# Patient Record
Sex: FEMALE | Race: WHITE
[De-identification: names, ages, dates, MRNs, and addresses within clinical notes are randomized per-mention and may not be internally consistent; named-entity substitution may affect disease eponyms.]

---

## 2017-02-23 NOTE — CARD
--------------- APPROVED REPORT --------------





EXAM: Two-dimensional and M-mode echocardiogram with Doppler and color Doppler.



Other Information 

Quality : GoodHR: 59bpm

Rhythm : Bradycardia



INDICATION

Cardiomaegaly



RISK FACTORS

Obesity   



2D DIMENSIONS 

RVDd2.3 (2.9-3.5cm)Left Atrium(2D)3.8 (1.6-4.0cm)

IVSd0.9 (0.7-1.1cm)Aortic Root(2D)2.6 (2.0-3.7cm)

LVDd5.1 (3.9-5.9cm)LVOT Diameter2.2 (1.8-2.4cm)

PWd0.8 (0.7-1.1cm)LVDs3.4 (2.5-4.0cm)

FS (%) 33.8 %SV78.5 ml

LVEF(%)62.3 (>50%)



Aortic Valve

AoV Peak Haile.144.2cm/sAoV VTI30.7cm

AO Peak GR.8.3mmHgLVOT Peak Haile.96.2cm/s

LVOT  VTI 23.65cmAO Mean GR.4mmHg

[place] (VMAX)2.46hq4HMY   (VTI)2.97cm2



Mitral Valve

MV E Skjrxsli526.2cm/sMV DECEL [id number]

MV A Fpnmbyay79.8cm/sMV E Mean Gr.2mmHg

MV [id number]/A  Ratio1.1

MV A [id number] (PHT)3.68cm2



TDI

E/Lateral E'13.2E/Medial E'16.9



Pulmonary Valve

PV Peak Utnyafjw79.4cm/sPV Peak Grad.3mmHg



Pulmonary Vein

S1 Eafvmidv76.6cm/sD2 Pjeikfsx39.6cm/s

PVa [id number]



 LEFT VENTRICLE 

The left ventricle is normal size. There is normal left ventricular wall thickness. The left ventricu
lar systolic function is normal and the ejection fraction is within normal range. The Ejection Fracti
on is 55-60%. There is normal LV segmental wall motion. The left ventricular diastolic function and f
illing is normal for age.



 RIGHT VENTRICLE 

The right ventricle is normal size. There is normal right ventricular wall thickness. The right ventr
icular systolic function is normal.



 ATRIA 

The left atrium is mildly dilated. The right atrium size is normal. The interatrial septum is intact 
with no evidence for an atrial septal defect or patent foramen ovale as noted on 2-D or Doppler imagi
ng.



 AORTIC VALVE 

The aortic valve is mildly thickened. Doppler and Color Flow revealed no significant aortic regurgita
tion. There is no significant aortic valvular stenosis.



 MITRAL VALVE 

The mitral valve leaflets are thickened. There is no evidence of mitral valve prolapse. There is no m
itral valve stenosis. Doppler and Color Flow revealed trace mitral regurgitation.



 TRICUSPID VALVE 

Doppler and Color Flow revealed trace tricuspid regurgitation. Unable to calculate PAP at exam time. 




 PULMONIC VALVE 

Doppler and Color Flow revealed no pulmonic valvular regurgitation. There is no pulmonic valvular paz
nosis.



 GREAT VESSELS 

The aortic root is normal in size. The ascending aorta is normal in size. The IVC is normal in size a
nd collapses >50% with inspiration.



 PERICARDIAL EFFUSION 

There is no evidence of significant pericardial effusion.



Critical Notification

Critical Value: No



<Conclusion>

The left ventricular systolic function is normal and the ejection fraction is within normal range. Th
e Ejection Fraction is 55-60%.

There is normal LV segmental wall motion.

No significant valvular disease.

## 2018-09-13 ENCOUNTER — HOSPITAL ENCOUNTER (OUTPATIENT)
Dept: HOSPITAL 61 - KCIC MRI | Age: 63
Discharge: HOME | End: 2018-09-13
Attending: ORTHOPAEDIC SURGERY
Payer: COMMERCIAL

## 2018-09-13 DIAGNOSIS — M12.88: ICD-10-CM

## 2018-09-13 DIAGNOSIS — M25.78: ICD-10-CM

## 2018-09-13 DIAGNOSIS — M47.892: Primary | ICD-10-CM

## 2018-09-13 DIAGNOSIS — M48.02: ICD-10-CM

## 2018-09-13 PROCEDURE — 72141 MRI NECK SPINE W/O DYE: CPT

## 2018-09-13 NOTE — [facility]
EXAMINATION: Magnetic resonance imaging (MRI) of the cervical spine 

without contrast 9/13/2018 3:30 PM

 

HISTORY: Right shoulder pain

 

TECHNIQUE: Multiplanar multi-weighted MRI of the cervical spine was 

performed without intravenous contrast using the standard cervical spine 

protocol.

 

Contrast information:

None administered

 

COMPARISON: None available.

 

FINDINGS: Evaluation is degraded by motion artifact.

 

The alignment of the cervical spine is normal. Vertebral bodies 

demonstrate normal signal intensity on all sequences.  No acute fracture 

is identified; however, if trauma is suspected, a CT scan would be a more 

sensitive examination for fractures.  The craniocervical junction is 

normal.  The visualized portions of the skull base and the posterior fossa

are normal. Moderate mucosal thickening involving the sphenoid sinus The 

spinal cord demonstrates normal signal intensity on all sequences.  

Intervertebral disks have normal height and signal intensity. There are no

annular fissures identified.  No soft tissue abnormality is identified.  

Normal signal voids are present in the vertebral arteries. 

 

 

C2-C3: There is minimal disc bulge. There is mild facet arthropathy. There

is no uncovertebral joint disease. There is no neuroforaminal stenosis. 

There is no spinal canal stenosis.

 

C3-C4: There is minimal disc bulge. There is moderate right and mild left 

facet arthropathy. There is mild uncovertebral joint disease. There is 

mild right neuroforaminal stenosis. There is no spinal canal stenosis.

 

C4-C5: There is a posterior disc osteophyte complex asymmetric to the 

left. There is mild facet arthropathy. There is mild uncovertebral joint 

disease. There is mild left neuroforaminal stenosis. There is no spinal 

canal stenosis.

 

C5-C6: There is a posterior disc osteophyte complex asymmetric to the left

with a left central disc extrusion. There is mild facet arthropathy. There

is mild to moderate uncovertebral joint disease. There is mild to moderate

left and mild right neuroforaminal stenosis. There is no spinal canal 

stenosis.

 

C6-C7: There is a posterior discussed by complex. There is mild facet 

arthropathy. There is mild to moderate uncovertebral joint disease. There 

is mild to moderate right and mild left neuroforaminal stenosis. There is 

no spinal canal stenosis.

 

C7-T1: The disk is normal in configuration.  There is no facet 

arthropathy. There is no uncovertebral joint disease. There is no 

neuroforaminal stenosis. There is no spinal canal stenosis.

 

T1-T2: There is a mild disc bulge asymmetric to the left. Mild facet 

arthropathy. No significant neuroforaminal or spinal canal stenosis.

 

IMPRESSION:

Mild degenerative changes of the cervical spine as described in detail 

above.

 

Electronically signed by: Tiffany Carr MD (9/13/2018 4:54 PM) 

[facility]

## 2018-09-21 ENCOUNTER — HOSPITAL ENCOUNTER (OUTPATIENT)
Dept: HOSPITAL 61 - MRI | Age: 63
Discharge: HOME | End: 2018-09-21
Attending: ORTHOPAEDIC SURGERY
Payer: COMMERCIAL

## 2018-09-21 DIAGNOSIS — M75.101: Primary | ICD-10-CM

## 2018-09-21 PROCEDURE — 73221 MRI JOINT UPR EXTREM W/O DYE: CPT

## 2018-09-21 NOTE — RAD
EXAM: MRI right shoulder

 

DATE: 9/21/2018 3:30 PM

 

COMPARISON: None

 

INDICATION: RIGHT SHOULDER PAIN X 3 MONTHS, limited range of motion

 

TECHNIQUE: Multiplanar multisequence MR imaging of the right shoulder was 

performed without IV contrast.

 

FINDINGS:

No right humeral joint effusion. Subacromial subdeltoid bursal distention 

for full-thickness rotator cuff tear described below. Mild AC joint 

degenerative changes are seen.

 

There is a full-thickness, full width tear of the supraspinatus tendon and

full-thickness partial width tear of the infraspinatus tendon measuring 

approximately 3.5 cm in AP dimension. There is tendinous retraction to the

level of the glenoid. There is moderate increased signal within the distal

infraspinatus muscle belly consistent with moderate tendinosis. There is 

moderate fatty atrophy of the supraspinatus and infraspinatus muscle 

bellies. Mild fatty atrophy of the teres minor.

 

The intra-articular long head biceps tendon is not well visualized, 

possibly severe tendinosis or partial tear. The extra-articular long head 

biceps tendon is seen within the bicipital groove. 

 

Mild degeneration of the labrum is suspected although no discrete labral 

tear is identified.. 

 

No evidence of fracture or AVN. Of note the humeral head is high riding.

 

IMPRESSION: 

1.  Full-thickness, full width tear of the supraspinatus and 

full-thickness, partial width tear of the infraspinatus tendons with 

retraction to the level of the glenoid.  

2.  Severe tendinosis versus partial tear of the intra-articular long head

biceps tendon.

3.  Subacromial-subdeltoid bursal fluid from full-thickness rotator cuff 

tear.

 

Electronically signed by: Fran Dejesus MD (9/21/2018 5:10 PM) [facility]

## 2021-09-20 ENCOUNTER — HOSPITAL ENCOUNTER (OUTPATIENT)
Dept: HOSPITAL 61 - SURGPAT | Age: 66
End: 2021-09-20
Attending: ORTHOPAEDIC SURGERY
Payer: COMMERCIAL

## 2021-09-20 DIAGNOSIS — M17.11: ICD-10-CM

## 2021-09-20 DIAGNOSIS — Z01.818: Primary | ICD-10-CM

## 2021-09-20 DIAGNOSIS — R94.31: ICD-10-CM

## 2021-09-20 LAB
ALBUMIN SERPL-MCNC: 3.4 G/DL (ref 3.4–5)
ANION GAP SERPL CALC-SCNC: 10 MMOL/L (ref 6–14)
APTT BLD: 35 SEC (ref 24–38)
BASOPHILS # BLD AUTO: 0 X10^3/UL (ref 0–0.2)
BASOPHILS NFR BLD: 1 % (ref 0–3)
BUN SERPL-MCNC: 15 MG/DL (ref 7–20)
CALCIUM SERPL-MCNC: 8.5 MG/DL (ref 8.5–10.1)
CHLORIDE SERPL-SCNC: 107 MMOL/L (ref 98–107)
CO2 SERPL-SCNC: 27 MMOL/L (ref 21–32)
CREAT SERPL-MCNC: 0.8 MG/DL (ref 0.6–1)
EOSINOPHIL NFR BLD: 0.5 X10^3/UL (ref 0–0.7)
EOSINOPHIL NFR BLD: 8 % (ref 0–3)
ERYTHROCYTE [DISTWIDTH] IN BLOOD BY AUTOMATED COUNT: 13.1 % (ref 11.5–14.5)
GFR SERPLBLD BASED ON 1.73 SQ M-ARVRAT: 71.8 ML/MIN
GLUCOSE SERPL-MCNC: 111 MG/DL (ref 70–99)
HCT VFR BLD CALC: 36.4 % (ref 36–47)
HGB BLD-MCNC: 12.7 G/DL (ref 12–15.5)
LYMPHOCYTES # BLD: 2.5 X10^3/UL (ref 1–4.8)
LYMPHOCYTES NFR BLD AUTO: 38 % (ref 24–48)
MCH RBC QN AUTO: 31 PG (ref 25–35)
MCHC RBC AUTO-ENTMCNC: 35 G/DL (ref 31–37)
MCV RBC AUTO: 87 FL (ref 79–100)
MONO #: 0.6 X10^3/UL (ref 0–1.1)
MONOCYTES NFR BLD: 9 % (ref 0–9)
NEUT #: 3 X10^3/UL (ref 1.8–7.7)
NEUTROPHILS NFR BLD AUTO: 45 % (ref 31–73)
PLATELET # BLD AUTO: 236 X10^3/UL (ref 140–400)
POTASSIUM SERPL-SCNC: 3.3 MMOL/L (ref 3.5–5.1)
PROTHROMBIN TIME: 12.5 SEC (ref 11.7–14)
RBC # BLD AUTO: 4.16 X10^6/UL (ref 3.5–5.4)
SODIUM SERPL-SCNC: 144 MMOL/L (ref 136–145)
WBC # BLD AUTO: 6.6 X10^3/UL (ref 4–11)

## 2021-09-20 PROCEDURE — 87641 MR-STAPH DNA AMP PROBE: CPT

## 2021-09-20 PROCEDURE — 85025 COMPLETE CBC W/AUTO DIFF WBC: CPT

## 2021-09-20 PROCEDURE — 36415 COLL VENOUS BLD VENIPUNCTURE: CPT

## 2021-09-20 PROCEDURE — 85651 RBC SED RATE NONAUTOMATED: CPT

## 2021-09-20 PROCEDURE — 85610 PROTHROMBIN TIME: CPT

## 2021-09-20 PROCEDURE — 83036 HEMOGLOBIN GLYCOSYLATED A1C: CPT

## 2021-09-20 PROCEDURE — 80048 BASIC METABOLIC PNL TOTAL CA: CPT

## 2021-09-20 PROCEDURE — 93005 ELECTROCARDIOGRAM TRACING: CPT

## 2021-09-20 PROCEDURE — 82306 VITAMIN D 25 HYDROXY: CPT

## 2021-09-20 PROCEDURE — 85730 THROMBOPLASTIN TIME PARTIAL: CPT

## 2021-09-20 PROCEDURE — 71046 X-RAY EXAM CHEST 2 VIEWS: CPT

## 2021-09-20 PROCEDURE — 82040 ASSAY OF SERUM ALBUMIN: CPT

## 2021-09-20 NOTE — EKG
[facility]

              [address] [place] [address]

Test Date:    2021               Test Time:    11:39:44

Pat Name:     BRIDGETTE ROPER           Department:   

Patient ID:   [id number]           Room:          

Gender:       F                        Technician:   SJ

:          1955               Requested By: JOHNNY MORALES

Order Number: [id number]           Reading MD:   Talha Rothman

                                 Measurements

Intervals                              Axis          

Rate:         54                       P:            59

NE:           146                      QRS:          41

QRSD:         92                       T:            80

QT:           444                                    

QTc:          423                                    

                           Interpretive Statements

SINUS RHYTHM

MILD T WAVE CHANGES

Electronically Signed On 2021 14:22:12 CDT by Talha Rothman dentures

## 2021-09-21 LAB — HBA1C MFR BLD: 5 % (ref 4.8–5.6)

## 2021-09-21 NOTE — RAD
EXAM:  XR CHEST 2V 9/20/2021 11:56 AM



CLINICAL INDICATION:  Joint rehabilitation class, hypertension and asthma, preop evaluation.



COMPARISON:  None



TECHNIQUE:  PA and lateral views of the chest



FINDINGS:  The heart and mediastinum are normal.  Lungs are well-expanded and clear.  No consolidatio
n, pleural effusion, or pneumothorax.  Pulmonary vascularity is normal. There is mild thoracolumbar d
egenerative disc disease. A right shoulder prosthesis is partially visualized. There is some heteroto
pic ossification at the inferior right axillary recess.



IMPRESSION: No acute cardiopulmonary abnormality.



Electronically signed by: Chitra Larsen MD (9/21/2021 10:11 AM) [id number]

## 2021-09-27 VITALS — SYSTOLIC BLOOD PRESSURE: 118 MMHG | DIASTOLIC BLOOD PRESSURE: 54 MMHG

## 2021-10-11 RX ADMIN — BACITRACIN SCH MLS/HR: 5000 INJECTION, POWDER, FOR SOLUTION INTRAMUSCULAR at 20:00

## 2021-10-12 ENCOUNTER — HOSPITAL ENCOUNTER (INPATIENT)
Dept: HOSPITAL 61 - SURG | Age: 66
LOS: 4 days | Discharge: HOME | DRG: 470 | End: 2021-10-16
Attending: ORTHOPAEDIC SURGERY | Admitting: ORTHOPAEDIC SURGERY
Payer: COMMERCIAL

## 2021-10-12 VITALS — WEIGHT: 156.31 LBS | HEIGHT: 58 IN | BODY MASS INDEX: 32.81 KG/M2

## 2021-10-12 VITALS — DIASTOLIC BLOOD PRESSURE: 52 MMHG | SYSTOLIC BLOOD PRESSURE: 91 MMHG

## 2021-10-12 VITALS — DIASTOLIC BLOOD PRESSURE: 79 MMHG | SYSTOLIC BLOOD PRESSURE: 113 MMHG

## 2021-10-12 VITALS — SYSTOLIC BLOOD PRESSURE: 97 MMHG | DIASTOLIC BLOOD PRESSURE: 51 MMHG

## 2021-10-12 VITALS — DIASTOLIC BLOOD PRESSURE: 70 MMHG | SYSTOLIC BLOOD PRESSURE: 112 MMHG

## 2021-10-12 VITALS — SYSTOLIC BLOOD PRESSURE: 125 MMHG | DIASTOLIC BLOOD PRESSURE: 55 MMHG

## 2021-10-12 VITALS — DIASTOLIC BLOOD PRESSURE: 54 MMHG | SYSTOLIC BLOOD PRESSURE: 99 MMHG

## 2021-10-12 VITALS — SYSTOLIC BLOOD PRESSURE: 99 MMHG | DIASTOLIC BLOOD PRESSURE: 52 MMHG

## 2021-10-12 VITALS — DIASTOLIC BLOOD PRESSURE: 65 MMHG | SYSTOLIC BLOOD PRESSURE: 119 MMHG

## 2021-10-12 VITALS — SYSTOLIC BLOOD PRESSURE: 109 MMHG | DIASTOLIC BLOOD PRESSURE: 62 MMHG

## 2021-10-12 VITALS — DIASTOLIC BLOOD PRESSURE: 68 MMHG | SYSTOLIC BLOOD PRESSURE: 128 MMHG

## 2021-10-12 VITALS — DIASTOLIC BLOOD PRESSURE: 62 MMHG | SYSTOLIC BLOOD PRESSURE: 106 MMHG

## 2021-10-12 DIAGNOSIS — Z88.2: ICD-10-CM

## 2021-10-12 DIAGNOSIS — E78.5: ICD-10-CM

## 2021-10-12 DIAGNOSIS — M17.11: Primary | ICD-10-CM

## 2021-10-12 DIAGNOSIS — E03.9: ICD-10-CM

## 2021-10-12 DIAGNOSIS — E66.9: ICD-10-CM

## 2021-10-12 DIAGNOSIS — J45.909: ICD-10-CM

## 2021-10-12 DIAGNOSIS — Z20.822: ICD-10-CM

## 2021-10-12 PROCEDURE — 73560 X-RAY EXAM OF KNEE 1 OR 2: CPT

## 2021-10-12 PROCEDURE — 84443 ASSAY THYROID STIM HORMONE: CPT

## 2021-10-12 PROCEDURE — C1776 JOINT DEVICE (IMPLANTABLE): HCPCS

## 2021-10-12 PROCEDURE — 82962 GLUCOSE BLOOD TEST: CPT

## 2021-10-12 PROCEDURE — 86850 RBC ANTIBODY SCREEN: CPT

## 2021-10-12 PROCEDURE — 82533 TOTAL CORTISOL: CPT

## 2021-10-12 PROCEDURE — 80048 BASIC METABOLIC PNL TOTAL CA: CPT

## 2021-10-12 PROCEDURE — 84439 ASSAY OF FREE THYROXINE: CPT

## 2021-10-12 PROCEDURE — G0378 HOSPITAL OBSERVATION PER HR: HCPCS

## 2021-10-12 PROCEDURE — A6550 NEG PRES WOUND THER DRSG SET: HCPCS

## 2021-10-12 PROCEDURE — G0379 DIRECT REFER HOSPITAL OBSERV: HCPCS

## 2021-10-12 PROCEDURE — 36415 COLL VENOUS BLD VENIPUNCTURE: CPT

## 2021-10-12 PROCEDURE — 85018 HEMOGLOBIN: CPT

## 2021-10-12 PROCEDURE — C1755 CATHETER, INTRASPINAL: HCPCS

## 2021-10-12 PROCEDURE — 83735 ASSAY OF MAGNESIUM: CPT

## 2021-10-12 PROCEDURE — 85025 COMPLETE CBC W/AUTO DIFF WBC: CPT

## 2021-10-12 PROCEDURE — A4930 STERILE, GLOVES PER PAIR: HCPCS

## 2021-10-12 PROCEDURE — 90686 IIV4 VACC NO PRSV 0.5 ML IM: CPT

## 2021-10-12 PROCEDURE — A6455 SELF-ADHER BAND >=5"/YD: HCPCS

## 2021-10-12 PROCEDURE — A4213 20+ CC SYRINGE ONLY: HCPCS

## 2021-10-12 PROCEDURE — C1713 ANCHOR/SCREW BN/BN,TIS/BN: HCPCS

## 2021-10-12 PROCEDURE — 86900 BLOOD TYPING SEROLOGIC ABO: CPT

## 2021-10-12 PROCEDURE — A4209 5+ CC STERILE SYRINGE&NEEDLE: HCPCS

## 2021-10-12 PROCEDURE — A6450 LT COMPRES BAND >=5"/YD: HCPCS

## 2021-10-12 PROCEDURE — 94760 N-INVAS EAR/PLS OXIMETRY 1: CPT

## 2021-10-12 PROCEDURE — 86901 BLOOD TYPING SEROLOGIC RH(D): CPT

## 2021-10-12 PROCEDURE — A6258 TRANSPARENT FILM >16<=48 IN: HCPCS

## 2021-10-12 PROCEDURE — 71046 X-RAY EXAM CHEST 2 VIEWS: CPT

## 2021-10-12 PROCEDURE — 90471 IMMUNIZATION ADMIN: CPT

## 2021-10-12 PROCEDURE — 81001 URINALYSIS AUTO W/SCOPE: CPT

## 2021-10-12 PROCEDURE — 85014 HEMATOCRIT: CPT

## 2021-10-12 PROCEDURE — 94640 AIRWAY INHALATION TREATMENT: CPT

## 2021-10-12 PROCEDURE — A6402 STERILE GAUZE <= 16 SQ IN: HCPCS

## 2021-10-12 RX ADMIN — OXYCODONE HYDROCHLORIDE AND ACETAMINOPHEN SCH MG: 500 TABLET ORAL at 21:06

## 2021-10-12 RX ADMIN — MONTELUKAST SODIUM SCH MG: 10 TABLET, FILM COATED ORAL at 21:06

## 2021-10-12 RX ADMIN — ONDANSETRON SCH MG: 4 TABLET, ORALLY DISINTEGRATING ORAL at 18:30

## 2021-10-12 RX ADMIN — BACITRACIN SCH MLS/HR: 5000 INJECTION, POWDER, FOR SOLUTION INTRAMUSCULAR at 20:00

## 2021-10-12 RX ADMIN — BUDESONIDE SCH MG: 0.5 INHALANT RESPIRATORY (INHALATION) at 21:00

## 2021-10-12 RX ADMIN — ALBUTEROL SULFATE SCH MG: 108 AEROSOL, METERED RESPIRATORY (INHALATION) at 21:00

## 2021-10-12 RX ADMIN — ASPIRIN SCH MG: 81 TABLET, COATED ORAL at 21:06

## 2021-10-12 RX ADMIN — ONDANSETRON SCH MG: 4 TABLET, ORALLY DISINTEGRATING ORAL at 12:00

## 2021-10-12 RX ADMIN — Medication SCH MG: at 18:30

## 2021-10-12 RX ADMIN — ONDANSETRON SCH MG: 2 INJECTION INTRAMUSCULAR; INTRAVENOUS at 18:00

## 2021-10-12 RX ADMIN — ONDANSETRON SCH MG: 2 INJECTION INTRAMUSCULAR; INTRAVENOUS at 23:59

## 2021-10-12 RX ADMIN — ONDANSETRON SCH MG: 2 INJECTION INTRAMUSCULAR; INTRAVENOUS at 12:00

## 2021-10-12 RX ADMIN — ONDANSETRON SCH MG: 4 TABLET, ORALLY DISINTEGRATING ORAL at 23:59

## 2021-10-12 RX ADMIN — TRAZODONE HYDROCHLORIDE SCH MG: 100 TABLET ORAL at 21:06

## 2021-10-12 NOTE — NUR
received fro recovery. she is denying pain at this time. states that she is having numbness 
and tingling. she has decreased sensation in right calf area. up to the bathroom and is 
unsteady in gait. up in recliner. history completed  Physical therapy here

## 2021-10-12 NOTE — NUR
Guadalupe states that she is beginning to have feeling behind her right knee. she has 
ambulated to the bathroom; gait is more steady at this time. tolerating food well. she was 
medicated with oxycodone.

## 2021-10-12 NOTE — PDOC4
OPERATIVE NOTE:


DATE OF PROCEDURE: October 12, 2021


  


PROCEDURE: 


Right total knee arthroplasty 





PREOPERATIVE DIAGNOSIS: 


Right knee osteoarthritis 





POSTOPERATIVE DIAGNOSIS: 


Same 





SURGEON: Johnny Isaac DO 





FIRST ASSISTANT: None





ANESTHESIA: General with periarticular injection 





COMPLICATIONS: None 





EBL: <50cc 





SPECIMEN: None 





DISPOSITION: To PACU stable. 





GROSS FINDINGS: Degenerative arthritis tricompartmental varus pattern





IMPLANT SPECIFICATIONS: 





[employer] 


3 Oxinium CR femur 


2 tibia 


9 mm CR tibial poly 


cement with gent 





DESCRIPTION OF PROCEDURE: 


The patient was seen in the preoperative holding area. The Surgical site was 

marked. Consent was verified. Patient received preoperative antibiotics and was 

taken back to the operating room. Once in the operating room, the department of 

anesthesia administered anesthetic as noted above. Timeout was observed. Once 

adequately anesthetized, the tourniquet was placed on the operative thigh. 

Sterile prep and drape was performed of the operative extremity. An Esmarch was 

used to exsanguinate the limb, and the tourniquet was inflated. At this point, a

15 cm anterior midline incision was made. Soft tissue dissection was carried out

sharply. Medial parapatellar arthrotomy was developed. The patella was everted. 

All visual osteophytes, meniscus, ACL as well as medial and lateral periosteal 

flaps were created.  Robotic trackers were placed in the tibia and femur as well

as confirmation pins in the femur and tibia.  Hip rotation center was obtained 

through movement.  At this point the distal femur and proximal tibia were 

mapped.  The surgical plan was developed.





Using the robotic bur the distal femur resection was performed.  The rotation of

the distal femur was set.  The appropriate sized block was pinned in place and 

the anterior resection was confirmed by robotic tracking.  At this point the 

4-in-1 cuts were made while protecting the surrounding soft tissues.





At this point the proximal tibia resection guide was put in place using the 

robotic tracking and it was pinned.  With the PCL retractor and 2 bent Hohmann's

the proximal tibial resection was performed.  Flexion and extension gaps were 

assessed and symmetric.  Trial implants were placed and the knee was run through

her motion while being tracked with both varus and valgus stress to confirm 

symmetric gapping.  The tibia rotation was marked and tibial preparation was 

completed after removal of all positional guidepins.





The patella was not resected.  With trial components in place, range of motion 

was excellent without patellar liftoff or subluxation and no varus or valgus 

instability. Full extension and flexion to 140 degrees was noted. We then 

finished both the femur and the tibia. All trial components were removed. 

Copious irrigation was run through the knee, and it was prepped for cement. 

Periarticular injection was given. 





Final components were cemented into place, cementing first the tibia, followed 

by the femur and then the patella. All excess cement was removed. The cement was

allowed to harden with the knee in full extension with a trial tibial spacer in 

place. TXA placed for 5 minutes. The final tibial polyethylene was then clipped 

into place, and a final range of motion was checked and found to be excellent. 

Copious irrigation was again run through the knee. It was suctioned dry. The 

tourniquet was deflated, and hemostasis was obtained with pressure and 

electrocautery. The parapatellar arthrotomy was closed, TXA placed, and layered 

soft tissue closure. A large bulky dressing was applied. Anesthesia was 

reversed, and the patient was transferred to postop in apparent stable conditio

n. 





DISPOSITION: 


The patient will be discharged to the general medical floor once recovered in 

the PACU. Pt. will begin on physical therapy protocol, postoperative medical 

management, and DVT prophylaxis. 





PROGNOSIS: 


Good











JOHNNY ISAAC DO       Oct 12, 2021 10:05

## 2021-10-12 NOTE — RAD
XR KNEE_RT 1-2 VIEWS



History: Reason: Patient in PACU POST OP / Spl. Instructions:  / History: 



Comparison: 9/3/2021



Technique: Portable AP and lateral views of the right knee



FINDINGS/

IMPRESSION: 

Postsurgical features from right total knee arthroplasty. Expected positioning of the distal femur an
d proximal tibial components without evidence of complication. Alignment is anatomic. Expected subcut
aneous air and fluid consistent with recent instrumentation. 



Electronically signed by: Td Price MD (10/12/2021 10:37 AM) [id number]

## 2021-10-13 VITALS — DIASTOLIC BLOOD PRESSURE: 44 MMHG | SYSTOLIC BLOOD PRESSURE: 107 MMHG

## 2021-10-13 VITALS — SYSTOLIC BLOOD PRESSURE: 97 MMHG | DIASTOLIC BLOOD PRESSURE: 52 MMHG

## 2021-10-13 VITALS — DIASTOLIC BLOOD PRESSURE: 56 MMHG | SYSTOLIC BLOOD PRESSURE: 117 MMHG

## 2021-10-13 VITALS — SYSTOLIC BLOOD PRESSURE: 122 MMHG | DIASTOLIC BLOOD PRESSURE: 53 MMHG

## 2021-10-13 VITALS — DIASTOLIC BLOOD PRESSURE: 50 MMHG | SYSTOLIC BLOOD PRESSURE: 79 MMHG

## 2021-10-13 VITALS — DIASTOLIC BLOOD PRESSURE: 46 MMHG | SYSTOLIC BLOOD PRESSURE: 91 MMHG

## 2021-10-13 LAB
HCT VFR BLD CALC: 31.3 % (ref 36–47)
HGB BLD-MCNC: 10.8 G/DL (ref 12–15.5)
MCHC RBC AUTO-ENTMCNC: 35 G/DL (ref 31–37)

## 2021-10-13 RX ADMIN — ALBUTEROL SULFATE SCH MG: 108 AEROSOL, METERED RESPIRATORY (INHALATION) at 13:06

## 2021-10-13 RX ADMIN — LEVOTHYROXINE SODIUM SCH MCG: 0.11 TABLET ORAL at 07:25

## 2021-10-13 RX ADMIN — Medication SCH MG: at 15:37

## 2021-10-13 RX ADMIN — OXYCODONE HYDROCHLORIDE AND ACETAMINOPHEN SCH MG: 500 TABLET ORAL at 07:48

## 2021-10-13 RX ADMIN — Medication SCH MG: at 07:48

## 2021-10-13 RX ADMIN — SENNOSIDES AND DOCUSATE SODIUM SCH TAB: 8.6; 5 TABLET ORAL at 07:47

## 2021-10-13 RX ADMIN — ONDANSETRON SCH MG: 2 INJECTION INTRAMUSCULAR; INTRAVENOUS at 06:00

## 2021-10-13 RX ADMIN — TRAZODONE HYDROCHLORIDE SCH MG: 100 TABLET ORAL at 21:02

## 2021-10-13 RX ADMIN — BUDESONIDE SCH MG: 0.5 INHALANT RESPIRATORY (INHALATION) at 19:54

## 2021-10-13 RX ADMIN — OXYCODONE HYDROCHLORIDE AND ACETAMINOPHEN SCH MG: 500 TABLET ORAL at 21:02

## 2021-10-13 RX ADMIN — MONTELUKAST SODIUM SCH MG: 10 TABLET, FILM COATED ORAL at 21:02

## 2021-10-13 RX ADMIN — MELOXICAM SCH MG: 7.5 TABLET ORAL at 07:47

## 2021-10-13 RX ADMIN — ALBUTEROL SULFATE SCH MG: 108 AEROSOL, METERED RESPIRATORY (INHALATION) at 07:27

## 2021-10-13 RX ADMIN — MULTIPLE VITAMINS W/ MINERALS TAB SCH TAB: TAB at 07:48

## 2021-10-13 RX ADMIN — ALBUTEROL SULFATE SCH MG: 108 AEROSOL, METERED RESPIRATORY (INHALATION) at 19:54

## 2021-10-13 RX ADMIN — ONDANSETRON SCH MG: 4 TABLET, ORALLY DISINTEGRATING ORAL at 06:00

## 2021-10-13 RX ADMIN — ALBUTEROL SULFATE SCH MG: 108 AEROSOL, METERED RESPIRATORY (INHALATION) at 00:00

## 2021-10-13 RX ADMIN — ASPIRIN SCH MG: 81 TABLET, COATED ORAL at 07:46

## 2021-10-13 RX ADMIN — ASPIRIN SCH MG: 81 TABLET, COATED ORAL at 21:02

## 2021-10-13 RX ADMIN — BACITRACIN SCH MLS/HR: 5000 INJECTION, POWDER, FOR SOLUTION INTRAMUSCULAR at 19:55

## 2021-10-13 RX ADMIN — PANTOPRAZOLE SODIUM SCH MG: 40 TABLET, DELAYED RELEASE ORAL at 07:25

## 2021-10-13 RX ADMIN — BUDESONIDE SCH MG: 0.5 INHALANT RESPIRATORY (INHALATION) at 07:27

## 2021-10-13 NOTE — NUR
Patient this morning is having some orthostatic BP issues with dizziness/nausea/headache. BP 
100/41 during OT with dizziness present. BP prior to PT 79/50 with dizziness and a headache. 
Patient was also appeared pale and was starting to sweat. Went to give bolus of fluids due 
to low BP but IV was blown. New IV started in left hand and bolus of 200 ml given to help 
improve her BP. Will continue to give IV fluids during the day today to help stabilize her 
BP. Dr Singer notified who rounded for [facility] this AM. Will continue to monitor.

## 2021-10-14 VITALS — SYSTOLIC BLOOD PRESSURE: 112 MMHG | DIASTOLIC BLOOD PRESSURE: 63 MMHG

## 2021-10-14 VITALS — SYSTOLIC BLOOD PRESSURE: 104 MMHG | DIASTOLIC BLOOD PRESSURE: 59 MMHG

## 2021-10-14 VITALS — DIASTOLIC BLOOD PRESSURE: 58 MMHG | SYSTOLIC BLOOD PRESSURE: 143 MMHG

## 2021-10-14 VITALS — DIASTOLIC BLOOD PRESSURE: 55 MMHG | SYSTOLIC BLOOD PRESSURE: 104 MMHG

## 2021-10-14 VITALS — SYSTOLIC BLOOD PRESSURE: 117 MMHG | DIASTOLIC BLOOD PRESSURE: 55 MMHG

## 2021-10-14 VITALS — DIASTOLIC BLOOD PRESSURE: 46 MMHG | SYSTOLIC BLOOD PRESSURE: 116 MMHG

## 2021-10-14 LAB
HCT VFR BLD CALC: 32.3 % (ref 36–47)
HGB BLD-MCNC: 10.8 G/DL (ref 12–15.5)
MCHC RBC AUTO-ENTMCNC: 34 G/DL (ref 31–37)

## 2021-10-14 PROCEDURE — 0SRC069 REPLACEMENT OF RIGHT KNEE JOINT WITH OXIDIZED ZIRCONIUM ON POLYETHYLENE SYNTHETIC SUBSTITUTE, CEMENTED, OPEN APPROACH: ICD-10-PCS | Performed by: ORTHOPAEDIC SURGERY

## 2021-10-14 PROCEDURE — 8E0Y0CZ ROBOTIC ASSISTED PROCEDURE OF LOWER EXTREMITY, OPEN APPROACH: ICD-10-PCS | Performed by: ORTHOPAEDIC SURGERY

## 2021-10-14 RX ADMIN — ASPIRIN SCH MG: 81 TABLET, COATED ORAL at 09:19

## 2021-10-14 RX ADMIN — BACITRACIN SCH MLS/HR: 5000 INJECTION, POWDER, FOR SOLUTION INTRAMUSCULAR at 17:08

## 2021-10-14 RX ADMIN — MULTIPLE VITAMINS W/ MINERALS TAB SCH TAB: TAB at 09:19

## 2021-10-14 RX ADMIN — Medication SCH MG: at 09:19

## 2021-10-14 RX ADMIN — MONTELUKAST SODIUM SCH MG: 10 TABLET, FILM COATED ORAL at 21:22

## 2021-10-14 RX ADMIN — MELOXICAM SCH MG: 7.5 TABLET ORAL at 09:20

## 2021-10-14 RX ADMIN — BUDESONIDE SCH MG: 0.5 INHALANT RESPIRATORY (INHALATION) at 07:11

## 2021-10-14 RX ADMIN — ALBUTEROL SULFATE SCH MG: 108 AEROSOL, METERED RESPIRATORY (INHALATION) at 07:10

## 2021-10-14 RX ADMIN — Medication SCH MG: at 15:58

## 2021-10-14 RX ADMIN — ALBUTEROL SULFATE SCH MG: 108 AEROSOL, METERED RESPIRATORY (INHALATION) at 00:00

## 2021-10-14 RX ADMIN — BUDESONIDE SCH MG: 0.5 INHALANT RESPIRATORY (INHALATION) at 20:19

## 2021-10-14 RX ADMIN — OXYCODONE HYDROCHLORIDE AND ACETAMINOPHEN SCH MG: 500 TABLET ORAL at 09:19

## 2021-10-14 RX ADMIN — LEVOTHYROXINE SODIUM SCH MCG: 0.11 TABLET ORAL at 05:40

## 2021-10-14 RX ADMIN — ASPIRIN SCH MG: 81 TABLET, COATED ORAL at 21:22

## 2021-10-14 RX ADMIN — SENNOSIDES AND DOCUSATE SODIUM SCH TAB: 8.6; 5 TABLET ORAL at 09:20

## 2021-10-14 RX ADMIN — OXYCODONE HYDROCHLORIDE AND ACETAMINOPHEN SCH MG: 500 TABLET ORAL at 21:22

## 2021-10-14 RX ADMIN — ALBUTEROL SULFATE SCH MG: 108 AEROSOL, METERED RESPIRATORY (INHALATION) at 20:19

## 2021-10-14 RX ADMIN — PANTOPRAZOLE SODIUM SCH MG: 40 TABLET, DELAYED RELEASE ORAL at 05:40

## 2021-10-14 RX ADMIN — ALBUTEROL SULFATE SCH MG: 108 AEROSOL, METERED RESPIRATORY (INHALATION) at 11:13

## 2021-10-14 RX ADMIN — BACITRACIN SCH MLS/HR: 5000 INJECTION, POWDER, FOR SOLUTION INTRAMUSCULAR at 21:23

## 2021-10-14 RX ADMIN — TRAZODONE HYDROCHLORIDE SCH MG: 100 TABLET ORAL at 21:22

## 2021-10-14 NOTE — NUR
CONTINUES TO BE DIZZY WHEN SITTING TO STANDING. NO SYMPTOMS FROM LYING TO STANDING. WILL 
CONTINUE IV FLUIDS THROUGH NIGHT. TRANSFERRED TO ROOM 442

## 2021-10-14 NOTE — NUR
CONTINUES TO HAVE ORTHOSTATIC BLOOD PRESSURE. SHE BECOMES DIZZY WITH SITTING TO STANDING. 
CONTINUES TO FEELS WEAK. RESTS IN RECLINER.  AT BEDSIDE.

## 2021-10-15 VITALS — DIASTOLIC BLOOD PRESSURE: 60 MMHG | SYSTOLIC BLOOD PRESSURE: 108 MMHG

## 2021-10-15 VITALS — SYSTOLIC BLOOD PRESSURE: 113 MMHG | DIASTOLIC BLOOD PRESSURE: 62 MMHG

## 2021-10-15 VITALS — SYSTOLIC BLOOD PRESSURE: 108 MMHG | DIASTOLIC BLOOD PRESSURE: 53 MMHG

## 2021-10-15 VITALS — DIASTOLIC BLOOD PRESSURE: 57 MMHG | SYSTOLIC BLOOD PRESSURE: 114 MMHG

## 2021-10-15 VITALS — SYSTOLIC BLOOD PRESSURE: 119 MMHG | DIASTOLIC BLOOD PRESSURE: 52 MMHG

## 2021-10-15 VITALS — SYSTOLIC BLOOD PRESSURE: 118 MMHG | DIASTOLIC BLOOD PRESSURE: 45 MMHG

## 2021-10-15 VITALS — SYSTOLIC BLOOD PRESSURE: 98 MMHG | DIASTOLIC BLOOD PRESSURE: 53 MMHG

## 2021-10-15 LAB
ANION GAP SERPL CALC-SCNC: 9 MMOL/L (ref 6–14)
APTT PPP: YELLOW S
BACTERIA #/AREA URNS HPF: (no result) /HPF
BASOPHILS # BLD AUTO: 0 X10^3/UL (ref 0–0.2)
BASOPHILS NFR BLD: 1 % (ref 0–3)
BILIRUB UR QL STRIP: NEGATIVE
BUN SERPL-MCNC: 9 MG/DL (ref 7–20)
CALCIUM SERPL-MCNC: 7.9 MG/DL (ref 8.5–10.1)
CHLORIDE SERPL-SCNC: 105 MMOL/L (ref 98–107)
CO2 SERPL-SCNC: 26 MMOL/L (ref 21–32)
CREAT SERPL-MCNC: 0.8 MG/DL (ref 0.6–1)
EOSINOPHIL NFR BLD: 0.2 X10^3/UL (ref 0–0.7)
EOSINOPHIL NFR BLD: 4 % (ref 0–3)
ERYTHROCYTE [DISTWIDTH] IN BLOOD BY AUTOMATED COUNT: 13.1 % (ref 11.5–14.5)
FIBRINOGEN PPP-MCNC: CLEAR MG/DL
GFR SERPLBLD BASED ON 1.73 SQ M-ARVRAT: 71.8 ML/MIN
GLUCOSE SERPL-MCNC: 114 MG/DL (ref 70–99)
HCT VFR BLD CALC: 28 % (ref 36–47)
HGB BLD-MCNC: 9.7 G/DL (ref 12–15.5)
LYMPHOCYTES # BLD: 0.9 X10^3/UL (ref 1–4.8)
LYMPHOCYTES NFR BLD AUTO: 16 % (ref 24–48)
MAGNESIUM SERPL-MCNC: 1.8 MG/DL (ref 1.8–2.4)
MCH RBC QN AUTO: 30 PG (ref 25–35)
MCHC RBC AUTO-ENTMCNC: 35 G/DL (ref 31–37)
MCV RBC AUTO: 88 FL (ref 79–100)
MONO #: 0.6 X10^3/UL (ref 0–1.1)
MONOCYTES NFR BLD: 12 % (ref 0–9)
NEUT #: 3.6 X10^3/UL (ref 1.8–7.7)
NEUTROPHILS NFR BLD AUTO: 67 % (ref 31–73)
NITRITE UR QL STRIP: NEGATIVE
PH UR STRIP: 7 [PH]
PLATELET # BLD AUTO: 197 X10^3/UL (ref 140–400)
POTASSIUM SERPL-SCNC: 3.6 MMOL/L (ref 3.5–5.1)
PROT UR STRIP-MCNC: NEGATIVE MG/DL
RBC # BLD AUTO: 3.19 X10^6/UL (ref 3.5–5.4)
RBC #/AREA URNS HPF: (no result) /HPF (ref 0–2)
SODIUM SERPL-SCNC: 140 MMOL/L (ref 136–145)
T4 FREE SERPL-MCNC: 1.7 NG/DL (ref 0.76–1.46)
THYROID STIM HORMONE (TSH): 0.13 UIU/ML (ref 0.36–3.74)
UROBILINOGEN UR-MCNC: 1 MG/DL
WBC # BLD AUTO: 5.3 X10^3/UL (ref 4–11)
WBC #/AREA URNS HPF: (no result) /HPF (ref 0–4)

## 2021-10-15 RX ADMIN — MULTIPLE VITAMINS W/ MINERALS TAB SCH TAB: TAB at 08:24

## 2021-10-15 RX ADMIN — SENNOSIDES AND DOCUSATE SODIUM SCH TAB: 8.6; 5 TABLET ORAL at 08:25

## 2021-10-15 RX ADMIN — Medication SCH MG: at 17:08

## 2021-10-15 RX ADMIN — ALBUTEROL SULFATE SCH MG: 108 AEROSOL, METERED RESPIRATORY (INHALATION) at 21:34

## 2021-10-15 RX ADMIN — PANTOPRAZOLE SODIUM SCH MG: 40 TABLET, DELAYED RELEASE ORAL at 08:25

## 2021-10-15 RX ADMIN — ENOXAPARIN SODIUM SCH MG: 40 INJECTION SUBCUTANEOUS at 09:19

## 2021-10-15 RX ADMIN — ALBUTEROL SULFATE SCH MG: 108 AEROSOL, METERED RESPIRATORY (INHALATION) at 11:04

## 2021-10-15 RX ADMIN — MELOXICAM SCH MG: 7.5 TABLET ORAL at 08:25

## 2021-10-15 RX ADMIN — ASPIRIN SCH MG: 81 TABLET, COATED ORAL at 08:25

## 2021-10-15 RX ADMIN — ALBUTEROL SULFATE SCH MG: 108 AEROSOL, METERED RESPIRATORY (INHALATION) at 07:38

## 2021-10-15 RX ADMIN — OXYCODONE HYDROCHLORIDE AND ACETAMINOPHEN SCH MG: 500 TABLET ORAL at 20:15

## 2021-10-15 RX ADMIN — MIDODRINE HYDROCHLORIDE SCH MG: 2.5 TABLET ORAL at 17:09

## 2021-10-15 RX ADMIN — ALBUTEROL SULFATE SCH MG: 108 AEROSOL, METERED RESPIRATORY (INHALATION) at 01:30

## 2021-10-15 RX ADMIN — BACITRACIN SCH MLS/HR: 5000 INJECTION, POWDER, FOR SOLUTION INTRAMUSCULAR at 02:23

## 2021-10-15 RX ADMIN — OXYCODONE HYDROCHLORIDE AND ACETAMINOPHEN SCH MG: 500 TABLET ORAL at 08:25

## 2021-10-15 RX ADMIN — BUDESONIDE SCH MG: 0.5 INHALANT RESPIRATORY (INHALATION) at 21:35

## 2021-10-15 RX ADMIN — MONTELUKAST SODIUM SCH MG: 10 TABLET, FILM COATED ORAL at 20:15

## 2021-10-15 RX ADMIN — LEVOTHYROXINE SODIUM SCH MCG: 0.11 TABLET ORAL at 06:01

## 2021-10-15 RX ADMIN — BUDESONIDE SCH MG: 0.5 INHALANT RESPIRATORY (INHALATION) at 07:38

## 2021-10-15 RX ADMIN — Medication SCH MG: at 08:24

## 2021-10-15 RX ADMIN — MIDODRINE HYDROCHLORIDE SCH MG: 2.5 TABLET ORAL at 13:16

## 2021-10-15 RX ADMIN — ASPIRIN SCH MG: 81 TABLET, COATED ORAL at 20:16

## 2021-10-15 RX ADMIN — TRAZODONE HYDROCHLORIDE SCH MG: 100 TABLET ORAL at 20:15

## 2021-10-15 NOTE — PN
DATE: 10/15/2021

SUBJECTIVE:  The patient has been evaluated since postoperative day #1 by 

myself.  I saw her postoperative day #1.  She had some lightheadedness with 

decreased blood pressure and difficulty with physical therapy completion due to 

the fact that she was lightheaded at that point.  Her hemoglobin appeared 

stable.  No signs or symptoms of infection of the wound.  She is doing well with

no signs or symptoms of deep venous thrombosis.  The distal neurovascular status

is fully intact.  She was ambulating minimally due to the lightheadedness.  I 

also saw her postoperative day #2 as well as day #3.  I was also able to see her

this morning where she has continued to increase her activity slightly.  I did 

talk with physical therapy about her progress.  She is progressing very well 

with weightbearing as tolerated.  Her pain is leveled at a level 3 now. The 

previous days, it was 4-5.  Today, she has no signs or symptoms of infection.  

No signs or symptoms of deep venous thrombosis.  Her distal neurovascular status

is fully intact.  Range of motion is 0 to about 50 degrees of flexion, almost 60

at this point. That is during her active phase only.  We anticipate she will be 

discharged either today or tomorrow to home with home health or rehabilitation 

facility depending on her wishes.  Orthopedically, she is stable for discharge 

at this point.







DOMINICK

DR: Bryan   DD: 10/15/2021 11:04

DT: 10/15/2021 11:41   TID: [id number]

## 2021-10-15 NOTE — RAD
PA and lateral chest.



HISTORY: Tachycardia



PA and lateral views were taken of the chest. There is linear atelectasis in the left lower lobe. Hea
rt is normal in size. There is no effusion.



IMPRESSION:

1. Increased basilar linear atelectasis.

2. No other acute infiltrates.



Electronically signed by: Aditya Drake MD (10/15/2021 1:08 PM) [facility]

## 2021-10-15 NOTE — PDOC1
History and Physical


Date of Admission


Date of Admission


DATE: 10/15/21 


TIME: 08:49





Identification/Chief Complaint


Chief Complaint


tachycardia, hypotension





Source


Source:  Chart review, Patient





History of Present Illness


History of Present Illness


CONSULT





pt had elective knee surgery on 10.12,   has been doing OK post op with pain,   

but has had relative low blood pressure for her,  BP in 110 systolic she reports

as low.   And HR this AM is 101 at rest. 


she had months of prior knee injection per Dr. Kurtz, and elected to proceed 

withknee replacement, 


she has not been hospitalized for about 30 years since she had her gallbladder 

out, has asthma, thyroid,  well controlled,   compliant with meds


she is retired, 


hoping to go home soon,  can transfer and use walker, walked 30+ feet





Past Medical History


Past Medical History


allergies


Cardiovascular:  No pertinent hx


Pulmonary:  Asthma


Heme/Onc:  No pertinent hx


Psych:  No pertinent hx


Musculoskeletal:  Osteoarthritis


Rheumatologic:  No pertinent hx


ENT:  No pertinent hx


Endocrine:  Hypothyroidism





Past Surgical History


Past Surgical History:  Cholecystectomy, Other (knee, right shoulder)





Family History


Family History:  No Significant


Family History:  Parent, Grandparents





Social History


Smoke:  No


ALCOHOL:  none


Drugs:  None





Current Medications


Current Medications





Current Medications


Morphine Sulfate 5 mg/Ketorolac Tromethamine 30 mg/Ropivacaine 60 ml/Epinephrine

HCl 0.5 mg/ Miscellaneous 63 ml @ 63 mls/hr 1X PERIOP  ONCE INT ART  Last 

administered on 10/12/21at 09:32;  Start 10/12/21 at 06:00;  Stop 10/12/21 at 

06:59;  Status DC


Fentanyl Citrate (Fentanyl 2ml Vial) 25 mcg PRN Q5MIN  PRN IVP MILD PAIN 1-3;  

Start 10/12/21 at 06:00;  Stop 10/12/21 at 16:00;  Status DC


Fentanyl Citrate (Fentanyl 2ml Vial) 50 mcg PRN Q5MIN  PRN IVP MODERATE PAIN 4-

6;  Start 10/12/21 at 06:00;  Stop 10/12/21 at 16:00;  Status DC


Morphine Sulfate (Morphine Sulfate) 1 mg PRN Q10MIN  PRN IVP SEVERE PAIN 7-10;  

Start 10/12/21 at 06:00;  Stop 10/12/21 at 16:00;  Status DC


Ringer's Solution 1,000 ml @  30 mls/hr Q24H IV  Last administered on 10/12/21at

06:56;  Start 10/12/21 at 06:00;  Stop 10/12/21 at 17:59;  Status DC


Hydromorphone HCl (Dilaudid) 0.5 mg PRN Q10MIN  PRN IVP SEVERE PAIN 7-10, 2nd 

CHOICE;  Start 10/12/21 at 06:00;  Stop 10/12/21 at 16:00;  Status DC


Prochlorperazine Edisylate (Compazine) 5 mg PACU PRN  PRN IVP NAUSEA, MRX1;  

Start 10/12/21 at 06:00;  Stop 10/12/21 at 16:00;  Status DC


Meloxicam (Mobic) 15 mg 1X PREOP  PRN PO PRIOR TO PROCEDURE Last administered on

10/12/21at 06:59;  Start 10/12/21 at 06:00;  Stop 10/12/21 at 11:38;  Status DC


Acetaminophen (Tylenol) 1,000 mg 1X PREOP  PRN PO PRIOR TO PROCEDURE Last 

administered on 10/12/21at 06:59;  Start 10/12/21 at 06:00;  Stop 10/12/21 at 

11:38;  Status DC


Cefazolin Sodium/ Dextrose 50 ml @  100 mls/hr 1X PREOP  PRN IV PRIOR TO 

PROCEDURE;  Start 10/12/21 at 06:00;  Stop 10/12/21 at 11:38;  Status DC


Tranexamic Acid 50 ml @ 50 mls/hr 1X PERIOP  ONCE INJ ;  Start 10/12/21 at 06:00

;  Stop 10/12/21 at 06:59;  Status DC


Tranexamic Acid 50 ml @ 50 mls/hr 1X PERIOP  ONCE INJ ;  Start 10/12/21 at 

08:00;  Stop 10/12/21 at 08:59;  Status DC


Ascorbic Acid (Vitamin C) 500 mg BID PO  Last administered on 10/15/21at 08:25; 

Start 10/12/21 at 21:00


Aspirin (Aspirin Chewable) 81 mg BID PO ;  Start 10/12/21 at 21:00;  Status 

Cancel


Ferrous Sulfate (Feosol) 325 mg BIDWMEALS PO ;  Start 10/12/21 at 17:00;  Status

Cancel


Fentanyl Citrate (Fentanyl 2ml Vial) 25 mcg PRN Q1HR  PRN IVP PAIN, 2nd CHOICE L

ast administered on 10/13/21at 21:06;  Start 10/11/21 at 20:00


Diphenhydramine HCl (Benadryl) 25 mg PRN Q6HRS  PRN IVP ITCHING;  Start 10/11/21

at 20:00


Multivitamins (Thera M Plus) 1 tab DAILY PO  Last administered on 10/15/21at 

08:24;  Start 10/13/21 at 09:00


Senna/Docusate Sodium (Senna Plus) 1 tab DAILY PO  Last administered on 

10/15/21at 08:25;  Start 10/13/21 at 09:00


Ferrous Sulfate (Feosol) 325 mg BIDWMEALS PO  Last administered on 10/15/21at 

08:24;  Start 10/12/21 at 17:00


Sodium Chloride 1,000 ml @  40 mls/hr Q24H IV  Last administered on 10/15/21at 

02:23;  Start 10/11/21 at 20:00


Prochlorperazine Maleate (Compazine) 10 mg PRN Q4HRS  PRN PO Nausea/vomiting, 

2nd choice;  Start 10/11/21 at 20:00


Metoclopramide HCl (Reglan Vial) 10 mg PRN Q4HRS  PRN IVP NAUSEA/VOMITING, 3rd 

CHOICE;  Start 10/11/21 at 20:00


Magnesium Hydroxide (Milk Of Magnesia) 2,400 mg 1X PRN  PRN PO CONSTIPATION;  

Start 10/13/21 at 06:00;  Stop 10/13/21 at 06:01;  Status DC


Bisacodyl (Dulcolax Supp) 10 mg 1X PRN  PRN PA CONSTIPATION;  Start 10/13/21 at 

16:00;  Stop 10/13/21 at 16:01;  Status DC


Zolpidem Tartrate (Ambien) 5 mg PRN QHS  PRN PO INSOMNIA, MAY REPEAT IN 1HR;  

Start 10/11/21 at 20:00


Calcium Carbonate/ Glycine (Tums) 500 mg PRN QID  PRN PO INDIGESTION;  Start 

10/11/21 at 20:00


Sodium Chloride (Normal Saline Flush) 10 ml QSHIFT  PRN IV AFTER MEDS AND BLOOD 

DRAWS;  Start 10/11/21 at 20:00


Meloxicam (Mobic) 15 mg DAILY PO  Last administered on 10/15/21at 08:25;  Start 

10/13/21 at 09:00


Ondansetron HCl (Zofran) 4 mg Q6HRS IVP ;  Start 10/12/21 at 12:00;  Stop 

10/13/21 at 06:01;  Status DC


Ondansetron HCl (Zofran Odt) 4 mg Q6HRS PO  Last administered on 10/12/21at 

18:30;  Start 10/12/21 at 12:00;  Stop 10/13/21 at 06:01;  Status DC


Ondansetron HCl (Zofran) 4 mg PRN Q6HRS  PRN IVP Nausea/vomiting, 1st choice;  

Start 10/12/21 at 12:00


Ondansetron HCl (Zofran Odt) 4 mg PRN Q6HRS  PRN PO Nausea/vomiting, 1st choice;

 Start 10/12/21 at 12:00


Oxycodone HCl (Roxicodone) 5 mg PRN Q4HRS  PRN PO PAIN Last administered on 

10/15/21at 08:25;  Start 10/11/21 at 20:00


Dextrose (Dextrose 50%-Water Syringe) 12.5 gm PRN Q15MIN  PRN IV SEE COMMENTS;  

Start 10/11/21 at 20:00


Cefazolin Sodium/ Dextrose 50 ml @  100 mls/hr Q6H IV  Last administered on 

10/13/21at 02:28;  Start 10/12/21 at 12:00;  Stop 10/13/21 at 03:29;  Status DC


Cefazolin Sodium/ Dextrose 50 ml @  100 mls/hr 1X  ONCE IV ;  Start 10/12/21 at 

06:00;  Stop 10/12/21 at 06:29;  Status UNV


Aspirin (Ecotrin) 81 mg BID PO  Last administered on 10/15/21at 08:25;  Start 

10/12/21 at 21:00;  Stop 11/12/21 at 20:59


Fentanyl Citrate (Fentanyl 5ml Vial) 250 mcg STK-MED ONCE .ROUTE ;  Start 

10/12/21 at 07:07;  Stop 10/12/21 at 07:07;  Status DC


Propofol (Diprivan) 200 mg STK-MED ONCE IV ;  Start 10/12/21 at 07:07;  Stop 

10/12/21 at 07:07;  Status DC


Lidocaine HCl (Lidocaine Pf 2% Vial) 5 ml STK-MED ONCE .ROUTE ;  Start 10/12/21 

at 07:07;  Stop 10/12/21 at 07:07;  Status DC


Rocuronium Bromide (Zemuron) 50 mg STK-MED ONCE .ROUTE ;  Start 10/12/21 at 

07:16;  Stop 10/12/21 at 07:16;  Status DC


Glycopyrrolate (Robinul) 1 mg STK-MED ONCE .ROUTE ;  Start 10/12/21 at 07:35;  

Stop 10/12/21 at 07:35;  Status DC


Tranexamic Acid (Cyklokapron) 1,000 mg STK-MED ONCE .ROUTE ;  Start 10/12/21 at 

07:36;  Stop 10/12/21 at 07:37;  Status Cancel


Tranexamic Acid 0 ml @ As Directed STK-MED ONCE .ROUTE ;  Start 10/12/21 at 

07:36;  Stop 10/12/21 at 07:36;  Status DC


Tranexamic Acid 0 ml @ As Directed STK-MED ONCE .ROUTE ;  Start 10/12/21 at 

07:36;  Stop 10/12/21 at 07:36;  Status DC


Tranexamic Acid (Cyklokapron) 2,000 mg 1X  ONCE TOP ;  Start 10/12/21 at 08:15; 

Stop 10/12/21 at 21:11;  Status DC


Ondansetron HCl (Zofran) 4 mg STK-MED ONCE .ROUTE ;  Start 10/12/21 at 08:20;  

Stop 10/12/21 at 08:20;  Status DC


Ropivacaine (Naropin 0.5%) 20 ml STK-MED ONCE .ROUTE ;  Start 10/12/21 at 08:36;

 Stop 10/12/21 at 08:36;  Status DC


Ketorolac Tromethamine (Toradol 30mg Vial) 30 mg STK-MED ONCE .ROUTE ;  Start 

10/12/21 at 09:21;  Stop 10/12/21 at 09:21;  Status DC


Ondansetron HCl (Zofran) 4 mg STK-MED ONCE .ROUTE ;  Start 10/12/21 at 09:21;  S

top 10/12/21 at 09:21;  Status DC


Neostigmine Bromide (Neostigmine Methylsulfate) 5 mg STK-MED ONCE .ROUTE ;  

Start 10/12/21 at 09:22;  Stop 10/12/21 at 09:22;  Status DC


Sodium Chloride (SODIUM CHLORIDE 20ml) 20 ml STK-MED ONCE IJ ;  Start 10/12/21 

at 09:27;  Stop 10/12/21 at 09:28;  Status DC


Sodium Chloride (SODIUM CHLORIDE 20ml) 20 ml STK-MED ONCE IJ ;  Start 10/12/21 

at 09:27;  Stop 10/12/21 at 09:28;  Status DC


Sodium Chloride (SODIUM CHLORIDE 20ml) 20 ml STK-MED ONCE IJ ;  Start 10/12/21 

at 09:28;  Stop 10/12/21 at 09:28;  Status DC


Sevoflurane (Ultane) 60 ml STK-MED ONCE IH ;  Start 10/12/21 at 09:45;  Stop 

10/12/21 at 09:46;  Status DC


Meperidine HCl (Demerol) 25 mg STK-MED ONCE .ROUTE ;  Start 10/12/21 at 09:57;  

Stop 10/12/21 at 09:57;  Status DC


Meperidine HCl (Demerol) 25 mg 1X  ONCE IV  Last administered on 10/12/21at 

10:39;  Start 10/12/21 at 10:15;  Stop 10/12/21 at 10:16;  Status DC


Influenza Virus Vaccine Quadrival (Flulaval Quad [id number] Syringe) 0.5 ml ONCE 

ONCE VAX IM  Last administered on 10/14/21at 09:26;  Start 10/12/21 at 19:45;  

Stop 10/12/21 at 19:50;  Status DC


Famotidine (Pepcid) 20 mg PRN DAILY  PRN PO GERD Last administered on 10/12/21at

21:06;  Start 10/12/21 at 20:45


Levothyroxine Sodium (Synthroid) 112 mcg DAILY06 PO  Last administered on 

10/15/21at 06:01;  Start 10/13/21 at 06:00


Montelukast Sodium (Singulair) 10 mg HS PO  Last administered on 10/14/21at 

21:22;  Start 10/12/21 at 21:00


Pantoprazole Sodium (Protonix) 40 mg DAILYAC PO  Last administered on 10/15/21at

08:25;  Start 10/13/21 at 07:30


Trazodone HCl (Desyrel) 100 mg HS PO  Last administered on 10/14/21at 21:22;  

Start 10/12/21 at 21:00


Non-Formulary Medication (Fluticasone/ Salmeterol (Advair 500-50 Diskus)) 2 inh 

BID IH ;  Start 10/12/21 at 21:00;  Status UNV


Non-Formulary Medication (Icosapent Ethyl (Vascepa)) 1 gm BID PO ;  Start 

10/12/21 at 21:00;  Status UNV


Albuterol Sulfate (Ventolin Neb Soln) 2.5 mg Q6HRS NEB  Last administered on 

10/15/21at 07:38;  Start 10/12/21 at 21:00


Budesonide (Pulmicort) 0.5 mg RTBID NEB  Last administered on 10/15/21at 07:38; 

Start 10/12/21 at 21:00


Sodium Chloride 1,000 ml @  100 mls/hr Q10H ONCE IV  Last administered on 

10/14/21at 12:34;  Start 10/14/21 at 12:00;  Stop 10/14/21 at 21:59;  Status DC





Active Scripts


Active


Reported


Pantoprazole Sodium  ** (Pantoprazole Sodium) 40 Mg Tablet.dr 40 Mg PO DAILYAC


Ibuprofen 800 Mg Tablet 800 Mg PO PRN Q6HRS PRN


Proair Hfa (Albuterol Sulfate) 8.5 Gm Hfa.aer.ad 2 Puff INH PRN Q6HRS PRN


Pepcid (Famotidine) 20 Mg Tablet 20 Mg PO PRN DAILY PRN


Voltaren Arthritis Pain (Diclofenac Sodium) 20 Gm Gel..gram. 20 Gm TP PRN DAILY 

PRN


Montelukast Sodium Tablet  ** (Montelukast Sodium) 10 Mg Tablet 10 Mg PO HS


Fluticasone Propionate Nasal Spray (Fluticasone Propionate) 16 Gm Spray.susp 2 

[place] NS DAILY


Advair 500-50 Diskus (Fluticasone/Salmeterol) 1 Each Disk.w.dev 2 Inh IH BID


Zyrtec (Cetirizine Hcl) 10 Mg Tablet 10 Mg PO DAILY


Vascepa (Icosapent Ethyl) 1 Gm Capsule 1 Gm PO BID


Levothyroxine Sodium 112 Mcg Tablet 112 Mcg PO DAILYAC


Trazodone Hcl 100 Mg Tablet 100 Mg PO HS





Allergies


Allergies:  


Coded Allergies:  


     Sulfa (Sulfonamide Antibiotics) (Verified  Adverse Reaction, Intermediate, 

Rash, 10/12/21)





ROS


General:  YES: Fatigue; 


   No: Chills, Night Sweats, Malaise, Appetite, Other


PSYCHOLOGICAL ROS:  No: Anxiety, Behavioral Disorder, Concentration difficultie,

Decreased libido, Depression, Disorientation, Hallucinations, Hostility, 

Irritablity, Memory difficulties, Mood Swings, Obsessive thoughts, Physical 

abuse, Sexual abuse, Sleep disturbances, Suicidal ideation, Other


Eyes:  No Blurry vision, No Decreased vision, No Double vision, No Dry eyes, No 

Excessive tearing, No Eye Pain, No Itchy Eyes, No Loss of vision, No 

Photophobia, No Scotomata, No Uses contacts, No Uses glasses, No Other


HEENT:  No: Heacaches, Visual Changes, Hearing change, Nasal congestion, Nasal 

discharge, Oral lesions, Sinus pain, Sore Throat, Epistaxis, Sneezing, Snoring, 

Tinnitus, Vertigo, Vocal changes, Other


Respiratory:  No: Cough, Hemoptysis, Orthopnea, Pleuritic Pain, Shortness of 

breath, SOB with excertion, Sputum Changes, Stridor, Tachypnea, Wheezing, Other


Cardiovascular:  No Chest Pain, No Palpitations, No Orthopnea, No Paroxysmal 

Noc. Dyspnea, No Edema, No Lt Headedness, No Other


Gastrointestinal:  No Nausea, No Vomiting, No Abdominal Pain, No Diarrhea, No 

Constipation, No Melena, No Hematochezia, No Other


Genitourinary:  No Dysuria, No Frequency, No Incontinence, No Hematuria, No 

Retention, No Discharge, No Urgency, No Pain, No Flank Pain, No Other, No , No ,

No , No , No , No , No 


Musculoskeletal:  Yes Gait Disturbance, Yes Joint Stiffness, Yes Joint Swelling;




   No Joint Pain, No Muscle Pain, No Muscular Weakness, No Pain In:, No Swelling

In:, No Other


Neurological:  No Behavorial Changes, No Bowel/Bladder ControlChng, No 

Confusion, No Dizziness, No Gait Disturbance, No Headaches, No Impaired 

Coord/balance, No Memory Loss, No Numbness/Tingling, No Seizures, No Speech 

Problems, No Tremors, No Visual Changes, No Weakness, No Other


Skin:  Yes Dry Skin; 


   No Eczema, No Hair Changes, No Lumps, No Mole Changes, No Mottling, No Nail 

Changes, No Pruritus, No Rash, No Skin Lesion Changes, No Other, No Acne





Physical Exam


General:  Alert, Oriented X3, Cooperative, No acute distress


HEENT:  Atraumatic, PERRLA, Mucous membr. moist/pink


Lungs:  Clear to auscultation, Normal air movement


Heart:  S1S2, no gallops, no murmurs


Abdomen:  Normal bowel sounds, Soft


Rectal Exam:  not examined


Extremities:  No cyanosis, No edema


Skin:  No rashes, No significant lesion


Neuro:  Normal gait, Normal speech, Sensation intact


Psych/Mental Status:  Mental status NL, Mood NL





Vitals


Vitals





Vital Signs








  Date Time  Temp Pulse Resp B/P (MAP) Pulse Ox O2 Delivery O2 Flow Rate FiO2


 


10/15/21 07:38     96 Room Air  


 


10/15/21 03:00 98.6 101 16 113/62 (79)    





 98.6       


 


10/13/21 06:30       2.0 











Labs


Labs





Laboratory Tests








Test


 10/14/21


09:10


 


Hemoglobin


 10.8 g/dL


(12.0-15.5)


 


Hematocrit


 32.3 %


(36.0-47.0)


 


Mean Corpuscular Hemoglobin


Concent 34 g/dL


(31-37)








Laboratory Tests








Test


 10/14/21


09:10


 


Hemoglobin


 10.8 g/dL


(12.0-15.5)


 


Hematocrit


 32.3 %


(36.0-47.0)


 


Mean Corpuscular Hemoglobin


Concent 34 g/dL


(31-37)











VTE Prophylaxis Ordered


VTE Prophylaxis Devices:  Yes


VTE Pharmacological Prophylaxi:  Yes





Assessment/Plan


Assessment/Plan


OA right knee, s/p surg on 10/12


post op relative hypotension and tachycardia,       not orthostatic yesterday,  

will recheck,  IV fluid going


hypothyroid, check TSH, t4


obese, BMI 33


asthma, check CXR





check chem 8, EKG,  CXR,  UA, ,  wound followed by ortho,





Justifications for Admission


Other Justification














TERRELL FREEMAN MD                 Oct 15, 2021 08:56

## 2021-10-16 VITALS — DIASTOLIC BLOOD PRESSURE: 58 MMHG | SYSTOLIC BLOOD PRESSURE: 101 MMHG

## 2021-10-16 VITALS — SYSTOLIC BLOOD PRESSURE: 101 MMHG | DIASTOLIC BLOOD PRESSURE: 58 MMHG

## 2021-10-16 VITALS — SYSTOLIC BLOOD PRESSURE: 93 MMHG | DIASTOLIC BLOOD PRESSURE: 36 MMHG

## 2021-10-16 VITALS — DIASTOLIC BLOOD PRESSURE: 48 MMHG | SYSTOLIC BLOOD PRESSURE: 107 MMHG

## 2021-10-16 RX ADMIN — ALBUTEROL SULFATE SCH MG: 108 AEROSOL, METERED RESPIRATORY (INHALATION) at 15:30

## 2021-10-16 RX ADMIN — PANTOPRAZOLE SODIUM SCH MG: 40 TABLET, DELAYED RELEASE ORAL at 08:57

## 2021-10-16 RX ADMIN — MULTIPLE VITAMINS W/ MINERALS TAB SCH TAB: TAB at 08:57

## 2021-10-16 RX ADMIN — BUDESONIDE SCH MG: 0.5 INHALANT RESPIRATORY (INHALATION) at 07:34

## 2021-10-16 RX ADMIN — OXYCODONE HYDROCHLORIDE AND ACETAMINOPHEN SCH MG: 500 TABLET ORAL at 08:57

## 2021-10-16 RX ADMIN — ENOXAPARIN SODIUM SCH MG: 40 INJECTION SUBCUTANEOUS at 08:58

## 2021-10-16 RX ADMIN — SENNOSIDES AND DOCUSATE SODIUM SCH TAB: 8.6; 5 TABLET ORAL at 08:57

## 2021-10-16 RX ADMIN — Medication SCH MG: at 16:29

## 2021-10-16 RX ADMIN — ALBUTEROL SULFATE SCH MG: 108 AEROSOL, METERED RESPIRATORY (INHALATION) at 11:44

## 2021-10-16 RX ADMIN — MELOXICAM SCH MG: 7.5 TABLET ORAL at 09:02

## 2021-10-16 RX ADMIN — Medication SCH MG: at 08:57

## 2021-10-16 RX ADMIN — ALBUTEROL SULFATE SCH MG: 108 AEROSOL, METERED RESPIRATORY (INHALATION) at 07:34

## 2021-10-16 RX ADMIN — MIDODRINE HYDROCHLORIDE SCH MG: 2.5 TABLET ORAL at 07:00

## 2021-10-16 RX ADMIN — LEVOTHYROXINE SODIUM SCH MCG: 0.11 TABLET ORAL at 05:30

## 2021-10-16 RX ADMIN — ASPIRIN SCH MG: 81 TABLET, COATED ORAL at 08:57

## 2021-10-16 RX ADMIN — MIDODRINE HYDROCHLORIDE SCH MG: 2.5 TABLET ORAL at 13:26

## 2021-10-16 NOTE — NUR
Pt discharged home with outpatient therapy. Discharge instructions and prescriptions 
discussed. Pt verbalized understanding. IV removed. Belongings were packed and pt was 
assisted to wheelchair and secured in car with .

## 2021-10-16 NOTE — PDOC3
Discharge Summary


Visit Information


Date of Admission:  Oct 14, 2021


Date of Discharge:  Oct 16, 2021


Admitting Diagnosis:  Right knee OA


Final Diagnosis


Right knee OA





Brief Hospital Course


Allergies





                                    Allergies








Coded Allergies Type Severity Reaction Last Updated Verified


 


  Sulfa (Sulfonamide Antibiotics) Adverse Reaction Intermediate Rash 10/12/21 

Yes








Vital Signs





Vital Signs








  Date Time  Temp Pulse Resp B/P (MAP) Pulse Ox O2 Delivery O2 Flow Rate FiO2


 


10/16/21 13:26  87  101/58    


 


10/16/21 11:44      Room Air  


 


10/16/21 07:37     98   


 


10/16/21 03:37 98.2  16     





 98.2       


 


10/15/21 08:00       2.0 








Lab Results





Laboratory Tests








Test


 10/15/21


09:40 10/15/21


17:00 10/15/21


20:17 10/16/21


08:23


 


White Blood Count


 5.3 x10^3/uL


(4.0-11.0) 


 


 





 


Red Blood Count


 3.19 x10^6/uL


(3.50-5.40) 


 


 





 


Hemoglobin


 9.7 g/dL


(12.0-15.5) 


 


 





 


Hematocrit


 28.0 %


(36.0-47.0) 


 


 





 


Mean Corpuscular Volume 88 fL ()    


 


Mean Corpuscular Hemoglobin 30 pg (25-35)    


 


Mean Corpuscular Hemoglobin


Concent 35 g/dL


(31-37) 


 


 





 


Red Cell Distribution Width


 13.1 %


(11.5-14.5) 


 


 





 


Platelet Count


 197 x10^3/uL


(140-400) 


 


 





 


Neutrophils (%) (Auto) 67 % (31-73)    


 


Lymphocytes (%) (Auto) 16 % (24-48)    


 


Monocytes (%) (Auto) 12 % (0-9)    


 


Eosinophils (%) (Auto) 4 % (0-3)    


 


Basophils (%) (Auto) 1 % (0-3)    


 


Neutrophils # (Auto)


 3.6 x10^3/uL


(1.8-7.7) 


 


 





 


Lymphocytes # (Auto)


 0.9 x10^3/uL


(1.0-4.8) 


 


 





 


Monocytes # (Auto)


 0.6 x10^3/uL


(0.0-1.1) 


 


 





 


Eosinophils # (Auto)


 0.2 x10^3/uL


(0.0-0.7) 


 


 





 


Basophils # (Auto)


 0.0 x10^3/uL


(0.0-0.2) 


 


 





 


Sodium Level


 140 mmol/L


(136-145) 


 


 





 


Potassium Level


 3.6 mmol/L


(3.5-5.1) 


 


 





 


Chloride Level


 105 mmol/L


() 


 


 





 


Carbon Dioxide Level


 26 mmol/L


(21-32) 


 


 





 


Anion Gap 9 (6-14)    


 


Blood Urea Nitrogen 9 mg/dL (7-20)    


 


Creatinine


 0.8 mg/dL


(0.6-1.0) 


 


 





 


Estimated GFR


(Cockcroft-Gault) 71.8 


 


 


 





 


Glucose Level


 114 mg/dL


(70-99) 


 


 





 


Calcium Level


 7.9 mg/dL


(8.5-10.1) 


 


 





 


Magnesium Level


 1.8 mg/dL


(1.8-2.4) 


 


 





 


Thyroid Stimulating Hormone


(TSH) 0.133 uIU/mL


(0.358-3.74) 


 


 





 


Free Thyroxine


 1.70 ng/dL


(0.76-1.46) 


 


 





 


Urine Collection Type  Unknown   


 


Urine Color  Yellow   


 


Urine Clarity  Clear   


 


Urine pH  7.0 (<5.0-8.0)   


 


Urine Specific Gravity


 


 1.010


(1.000-1.030) 


 





 


Urine Protein


 


 Negative mg/dL


(NEG-TRACE) 


 





 


Urine Glucose (UA)


 


 Negative mg/dL


(NEG) 


 





 


Urine Ketones (Stick)


 


 Negative mg/dL


(NEG) 


 





 


Urine Blood  Negative (NEG)   


 


Urine Nitrite  Negative (NEG)   


 


Urine Bilirubin  Negative (NEG)   


 


Urine Urobilinogen Dipstick


 


 1.0 mg/dL (0.2


mg/dL) 


 





 


Urine Leukocyte Esterase  Negative (NEG)   


 


Urine RBC


 


 Rare /HPF


(0-2) 


 





 


Urine WBC  Occ /HPF (0-4)   


 


Urine Squamous Epithelial


Cells 


 Many /LPF 


 


 





 


Urine Bacteria


 


 Few /HPF


(0-FEW) 


 





 


Glucose (Fingerstick)


 


 


 124 mg/dL


(70-99) 103 mg/dL


(70-99)


 


Test


 10/16/21


11:41 


 


 





 


Glucose (Fingerstick)


 124 mg/dL


(70-99) 


 


 











Laboratory Tests








Test


 10/15/21


17:00 10/15/21


20:17 10/16/21


08:23 10/16/21


11:41


 


Urine Collection Type Unknown    


 


Urine Color Yellow    


 


Urine Clarity Clear    


 


Urine pH 7.0 (<5.0-8.0)    


 


Urine Specific Gravity


 1.010


(1.000-1.030) 


 


 





 


Urine Protein


 Negative mg/dL


(NEG-TRACE) 


 


 





 


Urine Glucose (UA)


 Negative mg/dL


(NEG) 


 


 





 


Urine Ketones (Stick)


 Negative mg/dL


(NEG) 


 


 





 


Urine Blood Negative (NEG)    


 


Urine Nitrite Negative (NEG)    


 


Urine Bilirubin Negative (NEG)    


 


Urine Urobilinogen Dipstick


 1.0 mg/dL (0.2


mg/dL) 


 


 





 


Urine Leukocyte Esterase Negative (NEG)    


 


Urine RBC


 Rare /HPF


(0-2) 


 


 





 


Urine WBC Occ /HPF (0-4)    


 


Urine Squamous Epithelial


Cells Many /LPF 


 


 


 





 


Urine Bacteria


 Few /HPF


(0-FEW) 


 


 





 


Glucose (Fingerstick)


 


 124 mg/dL


(70-99) 103 mg/dL


(70-99) 124 mg/dL


(70-99)








Brief Hospital Course


Ms Rogers is a 67yo F with PMHx asthma, hypothyroidism, right knee OA admitted 

for TKA of right knee.


she had months of prior knee injection per Dr. Kurtz, and elected to proceed 

withknee replacement, 


she has not been hospitalized for about 30 years since she had her gallbladder 

out, has asthma, thyroid,  well controlled,   compliant with meds


she is retired, 





10/14: Has been doing OK post op with pain,   but has had relative low blood p

ressure for her,  BP in 110 systolic she reports as low.   And HR this AM is 101

at rest. 


10/15: hoping to go home soon,  can transfer and use walker, walked 30+ feet


10/16:


Potassium replaced mag and replace.  Still little "fuzzy" when she stands.  

States the nebulizer treatments dry her out and she prefer her home Advair 

inhaler.  Otherwise a little postop numbness inferior lateral and superomedial 

to her knee no numbness or tingling or swelling of right foot.  Able to ambulate

to the bathroom independently.  No chest pain.  Feels her shortness of breath is

at baseline





Problem list:


OA right knee, s/p surg on 10/12


post op relative hypotension and tachycardia, improved with IVF


hypothyroid - may need dose reduction as she is relatively hyperthyroid on labs


obese, BMI 33


asthma -continue Singulair and nebulizers.  She does prefer her Advair 500/50 

inhaler twice daily


AM cortisol, replaced mag and home with outpatient PT and xarelto


Recommend considering reduction in levothyroxine dosing outpatient discussed 

with patient.  All questions answered the best my ability will have outpatient 

physical therapy with [facility].





Greater than 30 minutes spent on d/c





You have a follow up appointment scheduled with Dr Isaac on 10/22 at 9 am here

at [facility]. Call if you have any questions or concerns [phone]





TOTAL KNEE HOME INSTRUCTIONS





HOME ENVIRONMENT:


Furniture should have firm cushions and sturdy arm supports. It should not be so

deep that you have difficulty rising to a stand.


Do not use walker as support to rise out of a chair as the can easily tip over.


Always have enough light in the room to avoid tripping.


Remove all throw rugs, electrical cord, or other tripping hazards.


Avoid slippery floors.


Ensure wide, clear pathways for walker or crutches to avoid falls.





ACTIVITY RESTRICTIONS:


You may bear FULL weight on your surgical leg.


Continue knee and leg exercises as instructed by your physical therapist.


DO NOT use a pillow under your affected knee. This may cause stiffening of your 

knee in a bend position.


Do not kneel on the surgical knee.





INCISION CARE:


You may remove dressing all the way down to the skin on postop day 2.  It is 

okay to shower and get the incision wet.  No bathing.  Use gentle soaps no 

ointments, peroxide or alcohol on the incision





Check your incision every day for increased swelling, redness, heat or drainage.

Note: your knee may be warm for 4-6 months... this is typical.


Make sure your polar pack is not in direct contact with your skin. Use a towel 

or similar item between the pack and your skin.


A light nonstick dressing should be placed in the incision if drainage is noted.


Steri-strips may be present... allow them to fall off on their own.


If you have staples, you will need to have them removed in the office in 2 

weeks.


Showers only until 4 weeks after surgery. Do not scrub the incision. Pat dry the

incision. Do not submerse or soak the incision site.





DIET:  Resume your previous home diet





OTHER IMPORTANT INSTRUCTIONS:


For any dental procedures, ask your dentist or call our office for an antibiotic

to be taken 1 hour before the appointment. This is for dental cleaning, 

checkups, or major work. This is for the rest of your life. No dental work 

should be done for the first 3 months following replacement surgery unless 

emergent.


Clean any cuts or scratches thoroughly with soap and water.


You should see your family physician for any infections other than your surgery 

sight for antibiotics if indicated.


Wear your VENANCIO hose for 22 hours/day for 6 weeks after surgery.


Continue taking the Iron and Vitamin C as directed until the prescription runs 

out. Iron may cause stools to darken. If painful constipation occurs call our 

office.


Take Enteric coated aspirin one tablet twice daily for 30 days. (If you were on 

aspirin prior to surgery... then resume that dose of aspirin after the 30 days)








FOLLOW UP:


See your surgeon in 2 weeks from the date of surgery for follow. Call our office

for appointment if you did not make one at the time of surgery scheduling.


Notify your surgeon if you have calf swelling and it does not resolve overnight.


Notify your surgeon of any shortness of breath, calf tenderness, or chest pain. 

If severe go to the Emergency Room to be evaluated.








WHEN TO CALL YOUR SURGEON:


1. Significant swelling or any new numbness in the limb that was operated on


2. Unrelenting pain


3. Fever or Chills (note that low grade temperatures are common in the first 72 

hours after surgery)


4. Redness around incisions


5. Continuous drainage or bleeding from wounds (some drainage is expected)


6. Any other worrisome condition





WHEN TO CALL YOUR FAMILY DOCTOR:


1. Flare up of any of your regular medical conditions





WHEN TO CALL 911:


1. Chest Pain


2. Shortness of Breath


Any other acute serious condition





Discharge Information


Condition at Discharge:  Improved


Follow Up:  Weeks (2)


Disposition/Orders:  D/C to Home


Scheduled


Cetirizine Hcl (Zyrtec) 10 Mg Tablet, 10 MG PO DAILY for allergy control, 

(Reported)


   Entered as Reported by: AMINATA WADSWORTH on 9/27/21 1557


Fluticasone Propionate (Fluticasone Propionate Nasal Spray) 16 Gm Bryant.susp, 2 

SPRAY NS DAILY for control allergies, (Reported)


   Entered as Reported by: AMINATA WADSWORTH on 9/27/21 1557


Fluticasone/Salmeterol (Advair 500-50 Diskus) 1 Each Disk.w.dev, 2 INH IH BID 

for asthma control, (Reported)


   Entered as Reported by: AMINATA WADSWORTH on 9/27/21 1557


   Last Taken: Unknown Dose on 10/11/21 2000     Last Action: Converted on 

10/12/21 2045 by JEANNE WOODWARD


Icosapent Ethyl (Vascepa) 1 Gm Capsule, 1 GM PO BID for control hyperlipidemia, 

(Reported)


   Entered as Reported by: AMINATA WADSWORTH on 9/27/21 1557


   Last Taken: Unknown Dose on 10/11/21 2000     Last Action: Converted on 

10/12/21 2045 by JEANNE WOODWARD


Levothyroxine Sodium (Levothyroxine Sodium) 112 Mcg Tablet, 112 MCG PO DAILYAC 

for THYROID SUPPLEMENT, #30 Ref 0 (Reported)


   Entered as Reported by: AMINATA WADSWORTH on 9/27/21 1557


   Last Taken: Unknown Dose on 10/12/21 0515     Last Action: Continued on 

10/12/21 2045 by JEANNE WOODWARD


Montelukast Sodium (Montelukast Sodium Tablet  **) 10 Mg Tablet, 10 MG PO HS for

FOR ASTHMA, Ref 0 (Reported)


   Entered as Reported by: AMINATA WADSWORTH on 9/27/21 1557


   Last Taken: Unknown Dose on 10/11/21 2000     Last Action: Continued on 

10/12/21 2045 by JEANNE WOODWARD


Pantoprazole Sodium (Pantoprazole Sodium  **) 40 Mg Tablet.dr, 40 MG PO DAILYAC 

for GERD, (Reported)


   Entered as Reported by: Cinthia Silva on 10/12/21 0645


   Last Taken: Unknown Dose on 10/12/21 0515     Last Action: Continued on 

10/12/21 2045 by JEANNE WOODWARD


Rivaroxaban (Xarelto) 10 Mg Tablet, 1 TAB PO DAILY for DVT PPX for 12 Days, #12 

Ref 0


   Prescribed by: JOHNNY DELGADO MD on 10/16/21 1546


Trazodone Hcl (Trazodone Hcl) 100 Mg Tablet, 100 MG PO HS for sleep aid, 

(Reported)


   Entered as Reported by: AMINATA WADSWORTH on 9/27/21 1557


   Last Taken: Unknown Dose on 10/11/21 2000     Last Action: Continued on 

10/12/21 2045 by JEANNE WOODWARD





Scheduled PRN


Albuterol Sulfate (Proair Hfa) 8.5 Gm Hfa.aer.ad, 2 PUFF INH PRN Q6HRS PRN for 

SHORTNESS OF BREATH, (Reported)


   Entered as Reported by: AMINATA WADSWORTH on 9/27/21 1557


Diclofenac Sodium (Voltaren Arthritis Pain) 20 Gm Gel..gram., 20 GM TP PRN DAILY

PRN for PAIN, (Reported)


   Entered as Reported by: AMINATA WADSWORTH on 9/27/21 1557


Famotidine (Pepcid) 20 Mg Tablet, 20 MG PO PRN DAILY PRN for control gerd, 

(Reported)


   Entered as Reported by: AMINATA WADSWORTH on 9/27/21 1557


   Last Taken: Unknown Dose on 10/11/21 2000     Last Action: Continued on 

10/12/21 2045 by JEANNE WOODWARD


Oxycodone Hcl (Oxycodone Hcl Immed.release **) 5 Mg Tablet, 5 MG PO PRN Q8HRS 

PRN for PAIN for 6 Days, #18


   Prescribed by: JOHNNY DELGADO MD on 10/16/21 1547





Discontinued Medications


Ibuprofen (Ibuprofen) 800 Mg Tablet, 800 MG PO PRN Q6HRS PRN for INFLAMMATION, 

(Reported)


   Entered as Reported by: AMINATA WADSWORTH on 9/27/21 1557





Justicifation of Admission Dx:


Justifications for Admission:


Justification of Admission Dx:  Yes











JOHNNY DELGADO MD        Oct 16, 2021 16:01

## 2021-10-16 NOTE — PDOC
[facility] PROGRESS NOTE


Date of Service


DOS:


DATE: 10/16/21 


TIME: 09:06





Chief Complaint


Chief Complaint


OA right knee, s/p surg on 10/12


post op relative hypotension and tachycardia,       not orthostatic yesterday,  

will recheck,  IV fluid going


hypothyroid - may need dose reduction as she is relatively hyperthyroid on labs


obese, BMI 33


asthma -continue Singulair and nebulizers.  She does prefer her Advair 500/50 

inhaler twice daily





Will check AM cortisol, replace mag and likely home with outpatient PT and 

xarelto





History of Present Illness


History of Present Illness


pt had elective knee surgery on 10.12,   has been doing OK post op with pain,   

but has had relative low blood pressure for her,  BP in 110 systolic she reports

as low.   And HR this AM is 101 at rest. 


she had months of prior knee injection per Dr. Kurtz, and elected to proceed 

withknee replacement, 


she has not been hospitalized for about 30 years since she had her gallbladder 

out, has asthma, thyroid,  well controlled,   compliant with meds


she is retired, 


hoping to go home soon,  can transfer and use walker, walked 30+ feet





Potassium replaced mag and replace.  Still little "fuzzy" when she stands.  

States the nebulizer treatments dry her out and she prefer her home Advair 

inhaler.  Otherwise a little postop numbness inferior lateral and superomedial 

to her knee no numbness or tingling or swelling of right foot.  Able to ambulate

to the bathroom independently.  No chest pain.  Feels her shortness of breath is

at baseline





Vitals/I&O


Vitals/I&O:





                                   Vital Signs








  Date Time  Temp Pulse Resp B/P (MAP) Pulse Ox O2 Delivery O2 Flow Rate FiO2


 


10/16/21 08:00      Room Air  


 


10/16/21 07:37     98   


 


10/16/21 03:37 98.2 82 16 93/36 (55)    





 98.2       


 


10/15/21 08:00       2.0 














                                    I & O   


 


 10/15/21 10/15/21 10/16/21





 15:00 23:00 07:00


 


Intake Total 400 ml 210 ml 300 ml


 


Output Total 1 ml 3 ml 


 


Balance 399 ml 207 ml 300 ml











Physical Exam


General:  Alert, Oriented X3, Cooperative, No acute distress


Abdomen:  Normal bowel sounds, Soft


Extremities:  No cyanosis, No edema


Skin:  No rashes, No significant lesion





Labs


Labs:





Laboratory Tests








Test


 10/15/21


09:40 10/15/21


17:00 10/15/21


20:17 10/16/21


08:23


 


White Blood Count


 5.3 x10^3/uL


(4.0-11.0) 


 


 





 


Red Blood Count


 3.19 x10^6/uL


(3.50-5.40) 


 


 





 


Hemoglobin


 9.7 g/dL


(12.0-15.5) 


 


 





 


Hematocrit


 28.0 %


(36.0-47.0) 


 


 





 


Mean Corpuscular Volume 88 fL ()    


 


Mean Corpuscular Hemoglobin 30 pg (25-35)    


 


Mean Corpuscular Hemoglobin


Concent 35 g/dL


(31-37) 


 


 





 


Red Cell Distribution Width


 13.1 %


(11.5-14.5) 


 


 





 


Platelet Count


 197 x10^3/uL


(140-400) 


 


 





 


Neutrophils (%) (Auto) 67 % (31-73)    


 


Lymphocytes (%) (Auto) 16 % (24-48)    


 


Monocytes (%) (Auto) 12 % (0-9)    


 


Eosinophils (%) (Auto) 4 % (0-3)    


 


Basophils (%) (Auto) 1 % (0-3)    


 


Neutrophils # (Auto)


 3.6 x10^3/uL


(1.8-7.7) 


 


 





 


Lymphocytes # (Auto)


 0.9 x10^3/uL


(1.0-4.8) 


 


 





 


Monocytes # (Auto)


 0.6 x10^3/uL


(0.0-1.1) 


 


 





 


Eosinophils # (Auto)


 0.2 x10^3/uL


(0.0-0.7) 


 


 





 


Basophils # (Auto)


 0.0 x10^3/uL


(0.0-0.2) 


 


 





 


Sodium Level


 140 mmol/L


(136-145) 


 


 





 


Potassium Level


 3.6 mmol/L


(3.5-5.1) 


 


 





 


Chloride Level


 105 mmol/L


() 


 


 





 


Carbon Dioxide Level


 26 mmol/L


(21-32) 


 


 





 


Anion Gap 9 (6-14)    


 


Blood Urea Nitrogen 9 mg/dL (7-20)    


 


Creatinine


 0.8 mg/dL


(0.6-1.0) 


 


 





 


Estimated GFR


(Cockcroft-Gault) 71.8 


 


 


 





 


Glucose Level


 114 mg/dL


(70-99) 


 


 





 


Calcium Level


 7.9 mg/dL


(8.5-10.1) 


 


 





 


Magnesium Level


 1.8 mg/dL


(1.8-2.4) 


 


 





 


Thyroid Stimulating Hormone


(TSH) 0.133 uIU/mL


(0.358-3.74) 


 


 





 


Free Thyroxine


 1.70 ng/dL


(0.76-1.46) 


 


 





 


Urine Collection Type  Unknown   


 


Urine Color  Yellow   


 


Urine Clarity  Clear   


 


Urine pH  7.0 (<5.0-8.0)   


 


Urine Specific Gravity


 


 1.010


(1.000-1.030) 


 





 


Urine Protein


 


 Negative mg/dL


(NEG-TRACE) 


 





 


Urine Glucose (UA)


 


 Negative mg/dL


(NEG) 


 





 


Urine Ketones (Stick)


 


 Negative mg/dL


(NEG) 


 





 


Urine Blood  Negative (NEG)   


 


Urine Nitrite  Negative (NEG)   


 


Urine Bilirubin  Negative (NEG)   


 


Urine Urobilinogen Dipstick


 


 1.0 mg/dL (0.2


mg/dL) 


 





 


Urine Leukocyte Esterase  Negative (NEG)   


 


Urine RBC


 


 Rare /HPF


(0-2) 


 





 


Urine WBC  Occ /HPF (0-4)   


 


Urine Squamous Epithelial


Cells 


 Many /LPF 


 


 





 


Urine Bacteria


 


 Few /HPF


(0-FEW) 


 





 


Glucose (Fingerstick)


 


 


 124 mg/dL


(70-99) 103 mg/dL


(70-99)











Comment


Review of Relevant


I have reviewed the following items venancio (where applicable) has been applied.


Medications:





Current Medications








 Medications


  (Trade)  Dose


 Ordered  Sig/Tuan


 Route


 PRN Reason  Start Time


 Stop Time Status Last Admin


Dose Admin


 


 Midodrine


  (Proamatine)  2.5 mg  [id number]


 PO


   10/15/21 13:00


    10/15/21 17:09





 


 Potassium Chloride


  (Klor-Con)  20 meq  1X  ONCE


 PO


   10/15/21 13:00


 10/15/21 13:01 DC 10/15/21 13:15





 


 Albuterol Sulfate


  (Ventolin Neb


 Soln)  2.5 mg  QID


 NEB


   10/16/21 09:00


    10/16/21 07:34














Justifications for Admission


Other Justification














JOHNNY DELGADO MD        Oct 16, 2021 09:36

## 2021-11-01 ENCOUNTER — HOSPITAL ENCOUNTER (INPATIENT)
Dept: HOSPITAL 61 - ER | Age: 66
LOS: 7 days | Discharge: HOME | DRG: 486 | End: 2021-11-08
Attending: STUDENT IN AN ORGANIZED HEALTH CARE EDUCATION/TRAINING PROGRAM | Admitting: STUDENT IN AN ORGANIZED HEALTH CARE EDUCATION/TRAINING PROGRAM
Payer: COMMERCIAL

## 2021-11-01 VITALS — DIASTOLIC BLOOD PRESSURE: 45 MMHG | SYSTOLIC BLOOD PRESSURE: 105 MMHG

## 2021-11-01 VITALS — HEIGHT: 59 IN | WEIGHT: 145.28 LBS | BODY MASS INDEX: 29.29 KG/M2

## 2021-11-01 VITALS — DIASTOLIC BLOOD PRESSURE: 41 MMHG | SYSTOLIC BLOOD PRESSURE: 96 MMHG

## 2021-11-01 VITALS — DIASTOLIC BLOOD PRESSURE: 40 MMHG | SYSTOLIC BLOOD PRESSURE: 105 MMHG

## 2021-11-01 DIAGNOSIS — R78.81: ICD-10-CM

## 2021-11-01 DIAGNOSIS — Z88.2: ICD-10-CM

## 2021-11-01 DIAGNOSIS — Z20.822: ICD-10-CM

## 2021-11-01 DIAGNOSIS — M19.011: ICD-10-CM

## 2021-11-01 DIAGNOSIS — E03.9: ICD-10-CM

## 2021-11-01 DIAGNOSIS — J45.909: ICD-10-CM

## 2021-11-01 DIAGNOSIS — T84.53XA: Primary | ICD-10-CM

## 2021-11-01 DIAGNOSIS — T81.41XA: ICD-10-CM

## 2021-11-01 DIAGNOSIS — B95.62: ICD-10-CM

## 2021-11-01 DIAGNOSIS — D64.9: ICD-10-CM
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
## 2021-11-01 DIAGNOSIS — T81.30XA: ICD-10-CM

## 2021-11-01 DIAGNOSIS — Z79.899: ICD-10-CM

## 2021-11-01 LAB
ANION GAP SERPL CALC-SCNC: 12 MMOL/L (ref 6–14)
BUN SERPL-MCNC: 18 MG/DL (ref 7–20)
CALCIUM SERPL-MCNC: 9.3 MG/DL (ref 8.5–10.1)
CHLORIDE SERPL-SCNC: 105 MMOL/L (ref 98–107)
CO2 SERPL-SCNC: 22 MMOL/L (ref 21–32)
CREAT SERPL-MCNC: 0.7 MG/DL (ref 0.6–1)
ERYTHROCYTE [DISTWIDTH] IN BLOOD BY AUTOMATED COUNT: 14.8 % (ref 11.5–14.5)
GFR SERPLBLD BASED ON 1.73 SQ M-ARVRAT: 83.7 ML/MIN
GLUCOSE SERPL-MCNC: 99 MG/DL (ref 70–99)
HCT VFR BLD CALC: 34.9 % (ref 36–47)
HGB BLD-MCNC: 11.5 G/DL (ref 12–15.5)
MCH RBC QN AUTO: 30 PG (ref 25–35)
MCHC RBC AUTO-ENTMCNC: 33 G/DL (ref 31–37)
MCV RBC AUTO: 90 FL (ref 79–100)
PLATELET # BLD AUTO: 474 X10^3/UL (ref 140–400)
POTASSIUM SERPL-SCNC: 3.5 MMOL/L (ref 3.5–5.1)
RBC # BLD AUTO: 3.86 X10^6/UL (ref 3.5–5.4)
SODIUM SERPL-SCNC: 139 MMOL/L (ref 136–145)
WBC # BLD AUTO: 16.1 X10^3/UL (ref 4–11)

## 2021-11-01 PROCEDURE — 87426 SARSCOV CORONAVIRUS AG IA: CPT

## 2021-11-01 PROCEDURE — A4930 STERILE, GLOVES PER PAIR: HCPCS

## 2021-11-01 PROCEDURE — 82565 ASSAY OF CREATININE: CPT

## 2021-11-01 PROCEDURE — 85027 COMPLETE CBC AUTOMATED: CPT

## 2021-11-01 PROCEDURE — 87040 BLOOD CULTURE FOR BACTERIA: CPT

## 2021-11-01 PROCEDURE — 87071 CULTURE AEROBIC QUANT OTHER: CPT

## 2021-11-01 PROCEDURE — 36415 COLL VENOUS BLD VENIPUNCTURE: CPT

## 2021-11-01 PROCEDURE — 85651 RBC SED RATE NONAUTOMATED: CPT

## 2021-11-01 PROCEDURE — 96365 THER/PROPH/DIAG IV INF INIT: CPT

## 2021-11-01 PROCEDURE — 36569 INSJ PICC 5 YR+ W/O IMAGING: CPT

## 2021-11-01 PROCEDURE — G0378 HOSPITAL OBSERVATION PER HR: HCPCS

## 2021-11-01 PROCEDURE — C1776 JOINT DEVICE (IMPLANTABLE): HCPCS

## 2021-11-01 PROCEDURE — A6454 SELF-ADHER BAND W>=3" <5"/YD: HCPCS

## 2021-11-01 PROCEDURE — 96375 TX/PRO/DX INJ NEW DRUG ADDON: CPT

## 2021-11-01 PROCEDURE — A6550 NEG PRES WOUND THER DRSG SET: HCPCS

## 2021-11-01 PROCEDURE — 94760 N-INVAS EAR/PLS OXIMETRY 1: CPT

## 2021-11-01 PROCEDURE — 73562 X-RAY EXAM OF KNEE 3: CPT

## 2021-11-01 PROCEDURE — 82550 ASSAY OF CK (CPK): CPT

## 2021-11-01 PROCEDURE — 85014 HEMATOCRIT: CPT

## 2021-11-01 PROCEDURE — C1755 CATHETER, INTRASPINAL: HCPCS

## 2021-11-01 PROCEDURE — 83605 ASSAY OF LACTIC ACID: CPT

## 2021-11-01 PROCEDURE — A6450 LT COMPRES BAND >=5"/YD: HCPCS

## 2021-11-01 PROCEDURE — 87205 SMEAR GRAM STAIN: CPT

## 2021-11-01 PROCEDURE — 85025 COMPLETE CBC W/AUTO DIFF WBC: CPT

## 2021-11-01 PROCEDURE — 86140 C-REACTIVE PROTEIN: CPT

## 2021-11-01 PROCEDURE — 80053 COMPREHEN METABOLIC PANEL: CPT

## 2021-11-01 PROCEDURE — 85018 HEMOGLOBIN: CPT

## 2021-11-01 PROCEDURE — 71045 X-RAY EXAM CHEST 1 VIEW: CPT

## 2021-11-01 PROCEDURE — 80048 BASIC METABOLIC PNL TOTAL CA: CPT

## 2021-11-01 PROCEDURE — 87076 CULTURE ANAEROBE IDENT EACH: CPT

## 2021-11-01 PROCEDURE — 87075 CULTR BACTERIA EXCEPT BLOOD: CPT

## 2021-11-01 PROCEDURE — 87077 CULTURE AEROBIC IDENTIFY: CPT

## 2021-11-01 PROCEDURE — 87186 SC STD MICRODIL/AGAR DIL: CPT

## 2021-11-01 PROCEDURE — 94640 AIRWAY INHALATION TREATMENT: CPT

## 2021-11-01 PROCEDURE — 96376 TX/PRO/DX INJ SAME DRUG ADON: CPT

## 2021-11-01 PROCEDURE — U0003 INFECTIOUS AGENT DETECTION BY NUCLEIC ACID (DNA OR RNA); SEVERE ACUTE RESPIRATORY SYNDROME CORONAVIRUS 2 (SARS-COV-2) (CORONAVIRUS DISEASE [COVID-19]), AMPLIFIED PROBE TECHNIQUE, MAKING USE OF HIGH THROUGHPUT TECHNOLOGIES AS DESCRIBED BY CMS-2020-01-R: HCPCS

## 2021-11-01 RX ADMIN — ALBUTEROL SULFATE SCH MG: 108 AEROSOL, METERED RESPIRATORY (INHALATION) at 16:46

## 2021-11-01 RX ADMIN — HYDROMORPHONE HYDROCHLORIDE PRN MG: 2 INJECTION INTRAMUSCULAR; INTRAVENOUS; SUBCUTANEOUS at 14:15

## 2021-11-01 RX ADMIN — HYDROMORPHONE HYDROCHLORIDE PRN MG: 2 INJECTION INTRAMUSCULAR; INTRAVENOUS; SUBCUTANEOUS at 10:48

## 2021-11-01 RX ADMIN — SENNOSIDES AND DOCUSATE SODIUM SCH TAB: 8.6; 5 TABLET ORAL at 21:00

## 2021-11-01 RX ADMIN — ALBUTEROL SULFATE SCH MG: 108 AEROSOL, METERED RESPIRATORY (INHALATION) at 20:00

## 2021-11-01 RX ADMIN — CEFEPIME SCH GM: 2 INJECTION, POWDER, FOR SOLUTION INTRAVENOUS at 14:12

## 2021-11-01 RX ADMIN — TRAZODONE HYDROCHLORIDE SCH MG: 100 TABLET ORAL at 21:00

## 2021-11-01 RX ADMIN — MONTELUKAST SODIUM SCH MG: 10 TABLET, FILM COATED ORAL at 21:00

## 2021-11-01 RX ADMIN — CEFEPIME SCH GM: 2 INJECTION, POWDER, FOR SOLUTION INTRAVENOUS at 21:00

## 2021-11-01 RX ADMIN — VANCOMYCIN HYDROCHLORIDE PRN EACH: 1 INJECTION, POWDER, LYOPHILIZED, FOR SOLUTION INTRAVENOUS at 16:58

## 2021-11-01 RX ADMIN — BUDESONIDE SCH MG: 0.5 INHALANT RESPIRATORY (INHALATION) at 20:00

## 2021-11-01 NOTE — PDOC2
Consult:


Patient seen evaluated today in room 404.  She is seen today secondary to right 

knee drainage.  She had a total knee replacement on October 12 and had done well

up until 2 to 3 days ago when she noticed drainage from the top part of her 

incision that has progressed over the last couple days.  She denies any fever 

chills or constitutional symptoms of infection and states that her pain has been

increasing over the last 2 to 3 days.  Denies any numbness or tingling or 

injury.





PAST MEDICAL/SURGICAL/FAMILY HISTORY/SOCIAL HISTORY/ AND ROS were reviewed on 

intake to include multiple system review. Copied to EHR.





EXAM:


--------





Well-nourished well-developed patient


General: No acute distress.


HEENT: Normocephalic.


Respiratory: Respirations are non-labored.


Cardiovascular: No edema.


Abdomen: soft


Neurologic: Alert.


Psychiatric: Cooperative.





Right knee reveals dehiscence of the proximal one third of the incision with 

some mild purulent drainage at this point.  Is generally tenderness to 

palpation.  There are some mild erythema surrounding the incision and the 

proximal one half.  Range of motion is somewhat limited due to pain.





ASSESSMENT & PLAN: 


--------------------------





Status post right total knee replacement on October 12, 2021 with superficial 

and possible deep infection





I discussed with the patient treatment options.  We discussed options both 

surgical and nonsurgical (medication, injection, therapy, lifestyle 

modification, assistive devices and other modalities).  We also discussed pros 

and cons of each. Greater than 30 minutes was spent with the patient with more 

than 50 percent of the time in discussion with regards to treatment and medical 

decision-making. Risk, benefits, alternative treatments, if any, and possible 

complications of surgery were discussed in detail, including, but not limited 

to: infection, scar, blood loss, chronic pain, need for reoperation, injury to 

nerve, artery or vein, blood clots, implant failure if used in procedure, and 

possible morbidity and mortality. Discussion of anticipated outcome, the post-

operative course, and the potential rehab needs. 





I have recommended surgical incision drainage and debridement and possible 

polyethylene swap.











JOHNNY MORALES DO       Nov 1, 2021 8:52 pm

## 2021-11-01 NOTE — NUR
Pharmacy Vancomycin Dosing Note



S:Consulted to monitor and dose vancomycin started 11/01/21.



O:BRIDGETTE ROPER is a 66 year old F with an infected knee wound.



Height: 4 feet, 11 inches

Weight: 65.9 kg

Dosing Weight: Actual



Other Antibiotics: 

CEFEPIME 2 IV Q 12HRS

FLAGYL 500 MG PO TID



LABS:

Last BUN: 18 

Last Creatinine: 0.7 

Creatinine Clearance: 48 mL/min

Last WBC: 16.1 

Last Procalcitonin: - 

Tmax (past 24 hours): 97.9 



Microbiology: 

BLOOD, WOUND CX PENDING





Vancomycin Dosing:

Loading Dose: 1500 mg x1

Dosing Weight: Actual

Target Trough: 10-20





A: Patient requires vancomycin for an infected knee wound, goal trough 10-20 mcg/ml. Her SCr 
is 0.7 with an eCrCl ~ 48 (using SCr = 1 due to age > 65). Patient received vancomycin 1500 
mg in ER this AM. Initiate the following, differing from protocol in dosing frequency due to 
patient's true CrCl likely higher than estimated: 





P: 1. Initiate Vancomycin 1000 mg IV q18h

   2. Follow up Trough level on 11/02/21 at 2030 

   3. Pharmacy will continue to monitor, follow and adjust therapy as needed.

 

 ERICA LOZANO RPH, 11/01/21 1700

## 2021-11-01 NOTE — PHYS DOC
Past Medical History


Smoking Status:  Never Smoker





General Adult


EDM:


Chief Complaint:  LOWER EXT PAIN





HPI:


HPI:





Patient is a 66  year old female who presents with right knee pain, swelling, 

redness and drainage from her incision site.  She reports symptoms for the least

the past 3 days.  She has been soaking through her pants and soaking through 

multiple dressing changes.  She denies fevers or chills.  She denies chest pain 

or dyspnea.  She denies nausea, vomiting abdominal pain.  She had a total knee 

arthroplasty on .  She had no complications with the surgery 

itself, though she was admitted and had medicine follow her for some hypotension

after her surgery.  She went home and had no difficulties until 3 days ago.  She

had no issues at her postop visit on .





Review of Systems:


Review of Systems:


Constitutional:   Denies fever or chills. []


HENT:   Denies nasal congestion or sore throat. [] 


Respiratory:   Denies cough or shortness of breath. [] 


Cardiovascular:   Denies chest pain or edema. [] 


GI:   Denies abdominal pain, nausea, vomiting


Musculoskeletal:   Right joint pain, swelling, redness, open wound and drainage.


Integument:   Skin redness, swelling, pain and drainage from her right anterior 

knee.


Neurologic:   Denies headache, focal weakness or sensory changes. [] 


Psychiatric:  Denies depression or anxiety. []





Heart Score:


C/O Chest Pain:  No


Risk Factors:


Risk Factors:  DM, Current or recent (<one month) smoker, HTN, HLP, family 

history of CAD, obesity.


Risk Scores:


Score 0 - 3:  2.5% MACE over next 6 weeks - Discharge Home


Score 4 - 6:  20.3% MACE over next 6 weeks - Admit for Clinical Observation


Score 7 - 10:  72.7% MACE over next 6 weeks - Early Invasive Strategies





Allergies:


Allergies:





Allergies








Coded Allergies Type Severity Reaction Last Updated Verified


 


  Sulfa (Sulfonamide Antibiotics) Adverse Reaction Intermediate Rash 10/12/21 

Yes











Physical Exam:


PE:





Constitutional: Well developed, well nourished, no acute distress, non-toxic 

appearance.  She does appear to be uncomfortable and in pain.


HENT: Normocephalic, atraumatic


Eyes: Sclera clear, anicteric


Neck: Trachea midline


Cardiovascular: +2 dorsalis pedis and radial pulses bilaterally


Lungs & Thorax:  Bilateral breath sounds clear to auscultation []


Abdomen: Abdomen soft, nondistended, nontender to palpation.


Skin: Significant warmth, soft tissue swelling, exquisite soft tissue tenderness

 of her anterior knee, the proximal incision site is open, partially dehisced, 

with bloody and purulent drainage spontaneously draining.  No focal fluctuance. 

 There is diffuse erythema, soft tissue swelling around the entire incision s

ite, worse proximally than distally.  No palpable crepitus or subcutaneous 

emphysema.  No dark, dusky discoloration.


Back: No deformity, full range of motion.


Extremities: Dehiscence of the right anterior knee incision, with surrounding 

cellulitis and purulent drainage, as described above.  No calf tenderness.  No 

circumferential erythema of the right knee.  Painful range of motion, secondary 

to pain.


Neurologic: Alert and oriented X 3, normal motor function, normal sensory 

function, no focal deficits noted. []


Psychologic: Affect normal, judgement normal, mood normal.  She is pleasant and 

cooperative.





EKG:


EKG:


[]





Radiology/Procedures:


Radiology/Procedures:


                                 IMAGING REPORT





                                     Signed





PATIENT: BRIDGETTE ROPER ACCOUNT: [id number]     MRN#: [id number]


: 1955           LOCATION: ER              AGE: 66


SEX: F                    EXAM DT: 21         ACCESSION#: [id number]


STATUS: REG ER            ORD. PHYSICIAN: ERICA ELLISON DO


REASON: Right knee infection


PROCEDURE: KNEE RIGHT 3V





XR KNEE 3 VIEWS_RT


Clinical indications: Reason: Right knee infection / Spl. Instructions:  / 

History: 





Findings:  No acute fracture or dislocation or osteolytic process is evident. T

here is anterior subcutaneous soft tissue edema involving the distal thigh and 

prepatellar region and proximal shin. Small right knee joint effusion is seen.








IMPRESSION: No acute osseous abnormality is evident. Soft tissue edema and small

 right knee joint effusion.





Electronically signed by: Jeanine Tillman MD (2021 9:29 AM) [id number]














DICTATED and SIGNED BY:     JEANINE TILLMAN MD


DATE:     21 [id number] 0





Course & Med Decision Making:


Course & Med Decision Making


Pertinent Labs and Imaging studies reviewed. (See chart for details)





The findings, differential diagnosis and plan of care discussed with the 

patient.  She confirms her tetanus is current.  She is given IV morphine and IV 

Dilaudid for pain.  Blood cultures are obtained.  I obtained a wound culture 

from the drainage on her right knee.  IV vancomycin is empirically given.  I 

have discussed that I recommend admission for treatment of wound infection.  She

 is accepted for admission by Dr. Gregorio.  Dr. Isaac of orthopedics has been 

paged for consult.  Multiple attempts were made throughout the day to contact 

Dr. Gamez, without success.  After the patient was already admitted to the 

hospitalist service I was finally able to get a hold of him and I explained that

 she was admitted, given IV antibiotics and that he would consult and see her in

 the morning.





Dragon Disclaimer:


Dragon Disclaimer:


This electronic medical record was generated, in whole or in part, using a voice

 recognition dictation system.





Departure


Departure


Impression:  


   Primary Impression:  


   Wound infection after surgery


Disposition:   ADMITTED AS INPATIENT


Admitting Physician:  HIMS


Condition:  GUARDED


Referrals:  


NICOL OCAMPO MD (PCP)











ERICA ELLISON DO              2021 07:58

## 2021-11-01 NOTE — RAD
XR KNEE 3 VIEWS_RT

Clinical indications: Reason: Right knee infection / Spl. Instructions:  / History: 



Findings:  No acute fracture or dislocation or osteolytic process is evident. There is anterior subcu
taneous soft tissue edema involving the distal thigh and prepatellar region and proximal shin. Small 
right knee joint effusion is seen.





IMPRESSION: No acute osseous abnormality is evident. Soft tissue edema and small right knee joint eff
usion.



Electronically signed by: Sandoval Tillman MD (11/1/2021 9:29 AM) [id number]

## 2021-11-01 NOTE — PDOC1
History and Physical


Date of Service:


DOS:


DATE: 11/1/21 


TIME: 13:25





Chief Complaint:


Problems:  


(1) Wound infection


Chief Complain:


Wound pain





History of Present Illness:


HPI:


Patient is 66-year-old female presented the emergency room today due to 3-day 

history of wound concerns.  Patient underwent a right TKA on October 12 and 

initially did well postop in terms of wound healing.  Said she was seen in 

clinic on the 22nd and wound was healing well then.  However over the past 3 

days she is noticed that the proximal parts of the surgical incision started 

"opening up."  Also noticed increased drainage from the wound as well.  She 

denies any sort of trauma to the site any sort of excessive movement.  Reports 

some generalized unwell feeling also.


I examined her patient was in a fair amount of pain.  She could not sit still 

she was uncomfortable.  She was really denying any sort of other symptoms 

including headache, vision change, dizziness, chest pain, shortness of breath, 

abdominal pain, dysuria.





Past Medical/Surgical History:


PMH/PSH:


Allergic rhinitis, asthma, hypothyroidism





Allergies:


Allergies:  


Coded Allergies:  


     Sulfa (Sulfonamide Antibiotics) (Verified  Adverse Reaction, Intermediate, 

Rash, 10/12/21)





Family History:


Family History:


Reviewed with patient none known





Social History:


Social History:


Denies alcohol tobacco drug use





Current Medications:


Current Medications





Current Medications


Morphine Sulfate (Morphine Sulfate) 4 mg 1X  ONCE IVP  Last administered on 

11/1/21at 08:57;  Start 11/1/21 at 08:15;  Stop 11/1/21 at 08:20;  Status DC


Ondansetron HCl (Zofran) 4 mg 1X  ONCE IVP  Last administered on 11/1/21at 

08:55;  Start 11/1/21 at 08:15;  Stop 11/1/21 at 08:20;  Status DC


Vancomycin HCl 1.5 gm/Sodium Chloride 500 ml @  250 mls/hr 1X  ONCE IV  Last 

administered on 11/1/21at 08:59;  Start 11/1/21 at 08:15;  Stop 11/1/21 at 

10:14;  Status DC


Hydromorphone HCl (Dilaudid) 1 mg 1X  ONCE IVP  Last administered on 11/1/21at 

09:57;  Start 11/1/21 at 09:45;  Stop 11/1/21 at 09:46;  Status DC


Ondansetron HCl (Zofran) 4 mg PRN Q8HRS  PRN IVP NAUSEA/VOMITING Last administ

ered on 11/1/21at 10:49;  Start 11/1/21 at 10:45;  Stop 11/1/21 at 12:09;  

Status DC


Morphine Sulfate (Morphine Sulfate) 4 mg PRN Q2HRS  PRN IVP pain;  Start 11/1/21

at 10:45


Hydromorphone HCl (Dilaudid) 1 mg PRN Q4HRS  PRN IVP severe pain Last 

administered on 11/1/21at 10:48;  Start 11/1/21 at 10:45


Sodium Chloride 1,000 ml @  75 mls/hr 1X  ONCE IV ;  Start 11/1/21 at 10:45;  

Stop 11/2/21 at 00:04


Ondansetron HCl (Zofran) 4 mg PRN Q6HRS  PRN IVP NAUSEA/VOMITING;  Start 11/1/21

at 12:00


Calcium Carbonate/ Glycine (Tums) 500 mg PRN Q3HRS  PRN PO UPSET STOMACH;  Start

11/1/21 at 12:00


Zolpidem Tartrate (Ambien) 5 mg PRN QHS  PRN PO INSOMNIA, MAY REPEAT IN 1HR;  

Start 11/1/21 at 12:00


Info (Non-Icu Electrolyte Protocol) 1 ea PRN DAILY  PRN MC SEE COMMENTS;  Start 

11/1/21 at 12:00


Oxycodone HCl (Roxicodone) 5 mg PRN Q3HRS  PRN PO BREAKTHROUGH PAIN;  Start 

11/1/21 at 12:00


Morphine Sulfate (Morphine Sulfate) 1 mg PRN Q1HR  PRN IV PAIN;  Start 11/1/21 

at 12:00


Morphine Sulfate (Morphine Sulfate) 2 mg PRN Q1HR  PRN IV PAIN;  Start 11/1/21 

at 12:00


Acetaminophen (Tylenol) 650 mg PRN Q6HRS  PRN PO Headaches, Temp > 101.5F;  

Start 11/1/21 at 12:00


Senna/Docusate Sodium (Senna Plus) 1 tab BID PO ;  Start 11/1/21 at 21:00


Cetirizine HCl (ZyrTEC) 10 mg DAILY PO ;  Start 11/2/21 at 09:00


Diclofenac Sodium (Voltaren) 1 silvia PRN DAILY  PRN TP PAIN;  Start 11/1/21 at 

12:15


Famotidine (Pepcid) 20 mg PRN DAILY  PRN PO control gerd;  Start 11/1/21 at 

12:15


Fluticasone Propionate (Flonase) 2 spray DAILY NS ;  Start 11/2/21 at 09:00


Levothyroxine Sodium (Synthroid) 112 mcg DAILYAC PO ;  Start 11/2/21 at 07:30


Montelukast Sodium (Singulair) 10 mg HS PO ;  Start 11/1/21 at 21:00


Pantoprazole Sodium (Protonix) 40 mg DAILYAC PO ;  Start 11/2/21 at 07:30


Trazodone HCl (Desyrel) 100 mg HS PO ;  Start 11/1/21 at 21:00


Non-Formulary Medication (Fluticasone/ Salmeterol (Advair 500-50 Diskus)) 2 inh 

BID IH ;  Start 11/1/21 at 21:00;  Status UNV


Budesonide (Pulmicort) 0.5 mg RTBID NEB ;  Start 11/1/21 at 20:00


Albuterol Sulfate (Ventolin Neb Soln) 2.5 mg RTQID NEB ;  Start 11/1/21 at 16:00





Active Scripts


Active


Oxycodone Hcl Immed.release ** (Oxycodone Hcl) 5 Mg Tablet 5 Mg PO PRN Q8HRS PRN

6 Days


Xarelto (Rivaroxaban) 10 Mg Tablet 1 Tab PO DAILY 12 Days


Reported


Pantoprazole Sodium  ** (Pantoprazole Sodium) 40 Mg Tablet.dr 40 Mg PO DAILYAC


Proair Hfa (Albuterol Sulfate) 8.5 Gm Hfa.aer.ad 2 Puff INH PRN Q6HRS PRN


Pepcid (Famotidine) 20 Mg Tablet 20 Mg PO PRN DAILY PRN


Voltaren Arthritis Pain (Diclofenac Sodium) 20 Gm Gel..gram. 20 Gm TP PRN DAILY 

PRN


Montelukast Sodium Tablet  ** (Montelukast Sodium) 10 Mg Tablet 10 Mg PO HS


Fluticasone Propionate Nasal Spray (Fluticasone Propionate) 16 Gm Spray.susp 2 

[place] NS DAILY


Advair 500-50 Diskus (Fluticasone/Salmeterol) 1 Each Disk.w.dev 2 Inh IH BID


Zyrtec (Cetirizine Hcl) 10 Mg Tablet 10 Mg PO DAILY


Vascepa (Icosapent Ethyl) 1 Gm Capsule 1 Gm PO BID


Levothyroxine Sodium 112 Mcg Tablet 112 Mcg PO DAILYAC


Trazodone Hcl 100 Mg Tablet 100 Mg PO HS





ROS:


Review of Systems


Review of System


Unless noted in HPI 14 point review of systems was negative





Physical Exam:


Vital Signs:





Vital Signs








  Date Time  Temp Pulse Resp B/P (MAP) Pulse Ox O2 Delivery O2 Flow Rate FiO2


 


11/1/21 10:48   16  95 Room Air  


 


11/1/21 10:02  80  145/67 (93)    


 


11/1/21 08:20 97.9       





 97.9       








Physcial Exam:


GEN:   No apparent distress.  Alert and oriented


HEENT:   Normal cephalic, atraumatic, external auditory canals are patent


EYES:   Extraocular muscles are intact, pupil are equally round and reactive to 

light and accommodation


MUSCULOSKELETAL:  Well developed , well nourished, good range of motion


ENDOCRINE:   No thyromegaly was palpated


LYMPHATICS:   No cervical chain or axillary nodes were noted


HEMATOPOIETIC:  No bruising


NECK:   Supple, no JVD, no thyromegaly was noted


LUNGS:   Clear to auscultation in all lung fields without rhonchi or wheezing


HEART:    RRR, S!, S2 present.  Peripheral pulses intact, no obvious murmurs 

noted


ABDOMEN:   Soft, nontender.  Positive bowel sounds, no organomegaly, normal b

owel sounds


EXTREMITIES:   Without clubbing, cyanosis, or edema.  Pedal pulses intact.  

Negative Homans sign


NEUROLOGIC:   Normal speech and tone.  A&O x 3, moves all extremities, no 

obvious focal deficits


PSYCHIATRIC:   Normal affect, normal mood. Stable


SKIN:   No ulcerations or rashes, good skin turgor, no jaundice


VASCULAR:   Good capillary refill, neurovascular bundle appears to be intact





Labs:


Labs:





Laboratory Tests








Test


 11/1/21


09:15 11/1/21


09:55


 


Sodium Level


 139 mmol/L


(136-145) 





 


Potassium Level


 3.5 mmol/L


(3.5-5.1) 





 


Chloride Level


 105 mmol/L


() 





 


Carbon Dioxide Level


 22 mmol/L


(21-32) 





 


Anion Gap 12 (6-14)  


 


Blood Urea Nitrogen


 18 mg/dL


(7-20) 





 


Creatinine


 0.7 mg/dL


(0.6-1.0) 





 


Estimated GFR


(Cockcroft-Gault) 83.7 


 





 


Glucose Level


 99 mg/dL


(70-99) 





 


Calcium Level


 9.3 mg/dL


(8.5-10.1) 





 


C-Reactive Protein,


Quantitative 327.9 mg/L


(0-3.3) 





 


White Blood Count


 


 16.1 x10^3/uL


(4.0-11.0)


 


Red Blood Count


 


 3.86 x10^6/uL


(3.50-5.40)


 


Hemoglobin


 


 11.5 g/dL


(12.0-15.5)


 


Hematocrit


 


 34.9 %


(36.0-47.0)


 


Mean Corpuscular Volume  90 fL () 


 


Mean Corpuscular Hemoglobin  30 pg (25-35) 


 


Mean Corpuscular Hemoglobin


Concent 


 33 g/dL


(31-37)


 


Red Cell Distribution Width


 


 14.8 %


(11.5-14.5)


 


Platelet Count


 


 474 x10^3/uL


(140-400)


 


Erythrocyte Sedimentation Rate  72 (0-25) 


 


Lactic Acid Level


 


 1.3 mmol/L


(0.4-2.0)








Laboratory Tests








Test


 11/1/21


09:15 11/1/21


09:55


 


Sodium Level


 139 mmol/L


(136-145) 





 


Potassium Level


 3.5 mmol/L


(3.5-5.1) 





 


Chloride Level


 105 mmol/L


() 





 


Carbon Dioxide Level


 22 mmol/L


(21-32) 





 


Anion Gap 12 (6-14)  


 


Blood Urea Nitrogen


 18 mg/dL


(7-20) 





 


Creatinine


 0.7 mg/dL


(0.6-1.0) 





 


Estimated GFR


(Cockcroft-Gault) 83.7 


 





 


Glucose Level


 99 mg/dL


(70-99) 





 


Calcium Level


 9.3 mg/dL


(8.5-10.1) 





 


C-Reactive Protein,


Quantitative 327.9 mg/L


(0-3.3) 





 


White Blood Count


 


 16.1 x10^3/uL


(4.0-11.0)


 


Red Blood Count


 


 3.86 x10^6/uL


(3.50-5.40)


 


Hemoglobin


 


 11.5 g/dL


(12.0-15.5)


 


Hematocrit


 


 34.9 %


(36.0-47.0)


 


Mean Corpuscular Volume  90 fL () 


 


Mean Corpuscular Hemoglobin  30 pg (25-35) 


 


Mean Corpuscular Hemoglobin


Concent 


 33 g/dL


(31-37)


 


Red Cell Distribution Width


 


 14.8 %


(11.5-14.5)


 


Platelet Count


 


 474 x10^3/uL


(140-400)


 


Erythrocyte Sedimentation Rate  72 (0-25) 


 


Lactic Acid Level


 


 1.3 mmol/L


(0.4-2.0)











Assessment/Plan


Assessment/Plan


Wound dehiscence, history of allergic rhinitis asthma hypothyroidism





-Patient underwent right TKA on October 12 initially healed well.  Over the past

3 days noticed some wound dehiscence and increased drainage


-Presented emergency room today and there was concern for wound infection


-Blood cultures and wound cultures obtained emergency room.  Received 1 dose 

vancomycin in emergency room


-We will continue vancomycin add cefepime Flagyl.  Infectious disease consulted.

 Okay to hold off on antibiotics should surgical team and/or infectious disease 

teams prefer;


-We will keep n.p.o. for now


-She reports that morphine and Dilaudid have really not been helping with pain. 

Will try fentanyl


-Hold off DVT prophylaxis for now


-Orthopedic team consulted


-Home meds resumed as indicated.





Justifications for Admission


Other Justification














JOHNNY BENNETT MD          Nov 1, 2021 13:28

## 2021-11-02 VITALS — SYSTOLIC BLOOD PRESSURE: 106 MMHG | DIASTOLIC BLOOD PRESSURE: 44 MMHG

## 2021-11-02 VITALS — DIASTOLIC BLOOD PRESSURE: 98 MMHG | SYSTOLIC BLOOD PRESSURE: 122 MMHG

## 2021-11-02 VITALS — SYSTOLIC BLOOD PRESSURE: 119 MMHG | DIASTOLIC BLOOD PRESSURE: 45 MMHG

## 2021-11-02 VITALS — DIASTOLIC BLOOD PRESSURE: 46 MMHG | SYSTOLIC BLOOD PRESSURE: 124 MMHG

## 2021-11-02 VITALS — SYSTOLIC BLOOD PRESSURE: 97 MMHG | DIASTOLIC BLOOD PRESSURE: 40 MMHG

## 2021-11-02 VITALS — DIASTOLIC BLOOD PRESSURE: 52 MMHG | SYSTOLIC BLOOD PRESSURE: 114 MMHG

## 2021-11-02 VITALS — SYSTOLIC BLOOD PRESSURE: 103 MMHG | DIASTOLIC BLOOD PRESSURE: 49 MMHG

## 2021-11-02 VITALS — DIASTOLIC BLOOD PRESSURE: 35 MMHG | SYSTOLIC BLOOD PRESSURE: 104 MMHG

## 2021-11-02 VITALS — SYSTOLIC BLOOD PRESSURE: 103 MMHG | DIASTOLIC BLOOD PRESSURE: 44 MMHG

## 2021-11-02 VITALS — SYSTOLIC BLOOD PRESSURE: 105 MMHG | DIASTOLIC BLOOD PRESSURE: 43 MMHG

## 2021-11-02 LAB
CREAT SERPL-MCNC: 0.7 MG/DL (ref 0.6–1)
GFR SERPLBLD BASED ON 1.73 SQ M-ARVRAT: 83.7 ML/MIN

## 2021-11-02 PROCEDURE — 02HV33Z INSERTION OF INFUSION DEVICE INTO SUPERIOR VENA CAVA, PERCUTANEOUS APPROACH: ICD-10-PCS | Performed by: STUDENT IN AN ORGANIZED HEALTH CARE EDUCATION/TRAINING PROGRAM

## 2021-11-02 PROCEDURE — 0SPC09Z REMOVAL OF LINER FROM RIGHT KNEE JOINT, OPEN APPROACH: ICD-10-PCS | Performed by: ORTHOPAEDIC SURGERY

## 2021-11-02 PROCEDURE — 0SUV09Z SUPPLEMENT RIGHT KNEE JOINT, TIBIAL SURFACE WITH LINER, OPEN APPROACH: ICD-10-PCS | Performed by: ORTHOPAEDIC SURGERY

## 2021-11-02 RX ADMIN — MORPHINE SULFATE PRN MG: 4 INJECTION, SOLUTION INTRAMUSCULAR; INTRAVENOUS at 17:07

## 2021-11-02 RX ADMIN — LEVOTHYROXINE SODIUM SCH MCG: 0.11 TABLET ORAL at 07:30

## 2021-11-02 RX ADMIN — MORPHINE SULFATE PRN MG: 4 INJECTION, SOLUTION INTRAMUSCULAR; INTRAVENOUS at 03:24

## 2021-11-02 RX ADMIN — BUDESONIDE SCH MG: 0.5 INHALANT RESPIRATORY (INHALATION) at 07:38

## 2021-11-02 RX ADMIN — PANTOPRAZOLE SODIUM SCH MG: 40 TABLET, DELAYED RELEASE ORAL at 07:30

## 2021-11-02 RX ADMIN — MORPHINE SULFATE PRN MG: 2 INJECTION, SOLUTION INTRAMUSCULAR; INTRAVENOUS at 21:15

## 2021-11-02 RX ADMIN — SENNOSIDES AND DOCUSATE SODIUM SCH TAB: 8.6; 5 TABLET ORAL at 08:05

## 2021-11-02 RX ADMIN — ALBUTEROL SULFATE SCH MG: 108 AEROSOL, METERED RESPIRATORY (INHALATION) at 15:25

## 2021-11-02 RX ADMIN — MORPHINE SULFATE PRN MG: 4 INJECTION, SOLUTION INTRAMUSCULAR; INTRAVENOUS at 11:47

## 2021-11-02 RX ADMIN — ALBUTEROL SULFATE SCH MG: 108 AEROSOL, METERED RESPIRATORY (INHALATION) at 07:38

## 2021-11-02 RX ADMIN — TRAZODONE HYDROCHLORIDE SCH MG: 100 TABLET ORAL at 22:40

## 2021-11-02 RX ADMIN — CETIRIZINE HYDROCHLORIDE SCH MG: 10 TABLET, FILM COATED ORAL at 08:05

## 2021-11-02 RX ADMIN — FLUTICASONE PROPIONATE SCH SPRAY: 50 SPRAY, METERED NASAL at 08:09

## 2021-11-02 RX ADMIN — LEVOTHYROXINE SODIUM SCH MCG: 0.11 TABLET ORAL at 09:30

## 2021-11-02 RX ADMIN — CEFEPIME SCH GM: 2 INJECTION, POWDER, FOR SOLUTION INTRAVENOUS at 08:05

## 2021-11-02 RX ADMIN — PANTOPRAZOLE SODIUM SCH MG: 40 TABLET, DELAYED RELEASE ORAL at 09:30

## 2021-11-02 RX ADMIN — MORPHINE SULFATE PRN MG: 2 INJECTION, SOLUTION INTRAMUSCULAR; INTRAVENOUS at 21:24

## 2021-11-02 RX ADMIN — VANCOMYCIN HYDROCHLORIDE SCH MLS/HR: 1 INJECTION, POWDER, FOR SOLUTION INTRAVENOUS at 03:26

## 2021-11-02 RX ADMIN — MORPHINE SULFATE PRN MG: 4 INJECTION, SOLUTION INTRAMUSCULAR; INTRAVENOUS at 15:01

## 2021-11-02 RX ADMIN — MONTELUKAST SODIUM SCH MG: 10 TABLET, FILM COATED ORAL at 22:40

## 2021-11-02 RX ADMIN — MORPHINE SULFATE PRN MG: 4 INJECTION, SOLUTION INTRAMUSCULAR; INTRAVENOUS at 08:05

## 2021-11-02 RX ADMIN — VANCOMYCIN HYDROCHLORIDE SCH MLS/HR: 1 INJECTION, POWDER, FOR SOLUTION INTRAVENOUS at 22:41

## 2021-11-02 RX ADMIN — ALBUTEROL SULFATE SCH MG: 108 AEROSOL, METERED RESPIRATORY (INHALATION) at 12:00

## 2021-11-02 RX ADMIN — CEFEPIME SCH GM: 2 INJECTION, POWDER, FOR SOLUTION INTRAVENOUS at 22:41

## 2021-11-02 NOTE — PDOC4
OPERATIVE NOTE:


DATE OF PROCEDURE: November 2, 2021





PROCEDURE: 


Right knee incision drainage with complete synovectomy and polyethylene exchange





POSTOPERATIVE DIAGNOSIS:


Right knee acute postoperative total knee infection





SURGEON: Johnny Isaac DO 





FIRST ASSISTANT: None





EBL: Approximately 50 cc





SPECIMEN: None 





ANESTHESIA: General





POSITION: Supine





COMPLICATIONS: None. 





GROSS FINDINGS: Purulence throughout the subcutaneous as well as intra-articular

portion of the knee consistent with acute postoperative periprosthetic joint 

infection.  Cultures were taken intraoperatively





DISPOSITION: To PACU stable. 





IMPLANT SPECIFICATIONS: 9 mm polyethylene exchange cruciate retaining





DESCRIPTION OF PROCEDURE:


Patient seen evaluated in the preoperative area site was marked and consent was 

verified.  She has been receiving perioperative antibiotics.  She was wheeled 

back the operating room and lied supine on the operative table.  Department of 

anesthesia administered general anesthetic.  Once adequately anesthetized 

tourniquet was placed on the right thigh.  Sterile prep and drape performed the 

right lower extremity.  After 3 minutes of elevation the tourniquet was 

inflated.  The area of the previous incision was opened its entire length.  Soft

tissue dissection was carried out sharply down to the extensor mechanism.  

Purulence was encountered and cultures were taken.  There was egress of 

mucopurulent material from a slight dehiscence of the distal portion of the 

arthrotomy.  All visible sutures were removed with rongeur.  The arthrotomy was 

opened at the previous layer.  Copious irrigation was run through the knee and 

it was suctioned dry.  At this point a subtotal synovectomy was performed with 

sharp and electrocautery dissection.  Rongeur was used to aid in synovectomy.  

Outside the articular joint surface the joint capsule was sharply debrided using

a rongeur as well as curette.  Once any inflammatory tissue was satisfactorily 

removed the existing tibial polyethylene was extracted and copious irrigation 

was run through the knee again.  At this point a final polyethylene of same size

was implanted.  The wound was again copiously irrigated suctioned dry.  At this 

point Betadine lavage was performed followed by further copious irrigation for a

total of 6 L with of irrigation.  Vancomycin powder was placed in the joint as 

well as outside the joint and the surgical wound.  A drain was placed.  Layered 

closure was performed with monofilament sutures the skin was closed with 

staples.  Large bulky dressing including a PIC O dressing was applied.  

Anesthesia was reversed and the patient was transferred to postop in apparent 

stable condition.





DISPOSITION: Patient Will be transferred to postop and anticipate discharge when

stable.





Prognosis: Guarded secondary to the known downsides of periprosthetic joint 

infections although it is encouraging that this was caught very early in the 

postoperative recovery less than 3 weeks.











JOHNNY ISAAC DO       Nov 2, 2021 8:44 pm

## 2021-11-02 NOTE — CONS
DATE OF CONSULTATION: 11/02/2021

REFERRING PHYSICIAN:  Team [facility].



REASON FOR CONSULTATION:  Right Knee prosthetic joint infection.



HISTORY OF PRESENT ILLNESS:  This is a 66-year-old female who presented to the 

ER with complaints of wound drainage from the superior aspect of right TKA 

incision site.  The patient underwent a right TKA on 10/12 and was doing well 

postoperatively until the above changes.  The patient was seen in clinic on the 

22nd and was told that wound was healing well.  It started opening up with 

increasing drainage.  The patient denies any fevers, chills, nausea, vomiting, 

diarrhea, or abdominal pain.  Did complain of generalized weakness.  She denies 

any trauma.  The patient uses walker at home without any difficulty.  Denies any

headache, sore throat, dysuria, hematuria, other joint pain, shortness of 

breath, chest pain.  White count was elevated.  COVID was negative.  CRP of 327.

 ESR of 72.  Hemoglobin of 11.5.  Knee x-ray showed no acute osseous 

abnormality, soft tissue swelling and small right knee effusion.  Orthopedics is

consulted.  She is awaiting I and D and possible polyethylene swab later today. 

The patient is currently on vancomycin and cefepime.  ID consultation has been 

requested for antibiotic management.



PAST MEDICAL HISTORY:  Hypothyroidism, asthma, DJD, right shoulder surgery, 

right TKA on 10/12.



ALLERGIES:  SULFA.



SOCIAL HISTORY:  Denies smoking, EtOH, or illicit drug use.  Lives at home with 

.



FAMILY HISTORY:  As per HPI.



CURRENT MEDICATIONS:  IV vancomycin and cefepime.  Other medications reviewed in

medication list.  The patient denies being on any antibiotics since last 

surgery.



REVIEW OF SYSTEMS:  As above, otherwise, negative.



PHYSICAL EXAMINATION:

VITAL SIGNS:  Afebrile otherwise noted.

GENERAL:  Alert, oriented x 3, pleasant female, lying in bed comfortably, in no 

acute distress.

HEENT:  Normocephalic, atraumatic.  Anicteric.  No thrush.

NECK:  Supple, no JVD, no lymphadenopathy.

LUNGS:  Clear bilaterally.

HEART:  S1, S2.

ABDOMEN:  Soft, nontender, nondistended.  Bowel sounds present.

EXTREMITIES:  Right lower extremity swelling present just above the knee 

dressing.  No calf tenderness.  Dressing taken down. Superior wound dehiscence 

noted at superior part of the incision with purulent drainage.  There is new one
in the middle, 

which is trying to open up, right knee effusion present, tenderness present.  
Range of motion is limited due to pain. 

Left lower extremity, no calf tenderness, no swelling.

DERMATOLOGIC:  Warm, dry, no generalized rash except for above.

NEUROLOGIC:  Alert, oriented x 3, grossly nonfocal.

PSYCHIATRIC:  Calm and cooperative.  PIV looks clean.



LABORATORY DATA:  WBC 16.1, hemoglobin 11.5, hematocrit 34.9, platelets 478.  

ESR 72, , creatinine 0.7, BUN 18. SARS COVID negative.



IMAGING:  Knee x-ray as above.



Microbiology

Blood culture pending at this time

Right knee swab culture pending at this time



IMPRESSION:

1.  Right knee arthroplasty on 10/12.  Right knee prosthetic joint infection.  
Awaiting surgery later today.

2.  Degenerative joint disease.

3.  Leukocytosis.

4.  History of asthma.

5.  Hypothyroidism.

6.  HISTORY OF ALLERGIES TO SULFA.

7.  Anemia.



RECOMMENDATIONS:

1.  Continue IV vancomycin and cefepime.

2.  Vancomycin level per pharmacy protocol.

3.  The patient is awaiting surgery later today.

4.  Monitor labs and cultures.

5.  Continue supportive care.

6.  We will modify treatment depending on culture results.

7.  PT and OT as directed.

8.  Wound care as directed.



Thank you for allowing me to participate in this patient's care.  If you have 

any questions, do not hesitate to contact me.  Discussed with nursing staff.







SANDEEP/VIS

DR: Zackary   DD: 11/02/2021 11:46

DT: 11/02/2021 12:09   TID: [id number]

[place]

## 2021-11-02 NOTE — NUR
SW following. Discussed with RN, pt from home with spouse, room air, NPO, rapid COVID-19 
negative. Pt having an I&D today. RN advised no SW needs at this time. SW will continue to 
follow.

## 2021-11-02 NOTE — PDOC
[employer] [facility] PROGRESS NOTE


Date of Service


DOS:


DATE: 11/2/21 


TIME: 15:57





Chief Complaint


Chief Complaint


Humeral fracture





History of Present Illness


History of Present Illness





Patient is 66-year-old female presented the emergency room today due to 3-day 

history of wound concerns.  Patient underwent a right TKA on October 12 and 

initially did well postop in terms of wound healing.  Said she was seen in 

clinic on the 22nd and wound was healing well then.  However over the past 3 

days she is noticed that the proximal parts of the surgical incision started 

"opening up."  Also noticed increased drainage from the wound as well.  She 

denies any sort of trauma to the site any sort of excessive movement.  Reports 

some generalized unwell feeling also.


I examined her patient was in a fair amount of pain.  She could not sit still 

she was uncomfortable.  She was really denying any sort of other symptoms includ

ing headache, vision change, dizziness, chest pain, shortness of breath, 

abdominal pain, dysuria.





11/2


Patient evaluated and examined at bedside.  Said her pain was pretty well 

controlled overnight.  Planning for surgery today.  Remains on vancomycin cefe

pime and Flagyl.  Continue for now.  Please obtain cultures from surgery.  PT OT

ordered.  Plan of care discussed with bedside RN.





Vitals/I&O


Vitals/I&O:





                                   Vital Signs








  Date Time  Temp Pulse Resp B/P (MAP) Pulse Ox O2 Delivery O2 Flow Rate FiO2


 


11/2/21 15:48      Room Air  


 


11/2/21 15:27     98   


 


11/2/21 15:23 99.6 88 18 124/46 (72)    





 99.6       














                                    I & O   


 


 11/1/21 11/1/21 11/2/21





 15:00 23:00 07:00


 


Intake Total  0 ml 160 ml


 


Balance  0 ml 160 ml











Physical Exam


General:  Alert, Oriented X3, Cooperative


Heart:  Regular rate, Normal S1, Normal S2


Lungs:  Clear


Abdomen:  Normal bowel sounds, Soft, No tenderness


Extremities:  No edema, Normal pulses


Skin:  Other (Proximal portion of right knee incision with serosanguineous 

drainage)





Labs


Labs:





Laboratory Tests








Test


 11/2/21


03:30


 


Creatinine


 0.7 mg/dL


(0.6-1.0)


 


Estimated GFR


(Cockcroft-Gault) 83.7 














Assessment and Plan


Assessmemt and Plan








Wound dehiscence, history of allergic rhinitis asthma hypothyroidism





-Patient underwent right TKA on October 12 initially healed well.  Over the past

3 days noticed some wound dehiscence and increased drainage


-Presented emergency room today and there was concern for wound infection


-Blood cultures and wound cultures obtained emergency room.  Received 1 dose 

vancomycin in emergency room


-We will continue vancomycin add cefepime Flagyl.  Infectious disease consulted.


-We will keep n.p.o. for now


-She reports that morphine and Dilaudid have really not been helping with pain. 

Will try fentanyl


-Hold off DVT prophylaxis for now


-Orthopedic team consulted planning for I&D today.


-Home meds resumed as indicated.





Comment


Review of Relevant


I have reviewed the following items venancio (where applicable) has been applied.


Medications:





Current Medications








 Medications


  (Trade)  Dose


 Ordered  Sig/Tuan


 Route


 PRN Reason  Start Time


 Stop Time Status Last Admin


Dose Admin


 


 Senna/Docusate


 Sodium


  (Senna Plus)  1 tab  BID


 PO


   11/1/21 21:00


    11/1/21 21:00





 


 Levothyroxine


 Sodium


  (Synthroid)  112 mcg  DAILYAC


 PO


   11/2/21 07:30


    11/2/21 09:30





 


 Montelukast Sodium


  (Singulair)  10 mg  HS


 PO


   11/1/21 21:00


    11/1/21 21:00





 


 Pantoprazole


 Sodium


  (Protonix)  40 mg  DAILYAC


 PO


   11/2/21 07:30


    11/2/21 09:30





 


 Trazodone HCl


  (Desyrel)  100 mg  HS


 PO


   11/1/21 21:00


    11/1/21 21:00





 


 Albuterol Sulfate


  (Ventolin Neb


 Soln)  2.5 mg  RTQID


 NEB


   11/1/21 16:00


    11/2/21 15:25





 


 Vancomycin HCl 1


 gm/Sodium Chloride  250 ml @ 


 250 mls/hr  Q18H


 IV


   11/2/21 03:00


    11/2/21 03:26














Justifications for Admission


Other Justification














JOHNNY BENNETT MD          Nov 2, 2021 16:00

## 2021-11-03 VITALS — SYSTOLIC BLOOD PRESSURE: 113 MMHG | DIASTOLIC BLOOD PRESSURE: 36 MMHG

## 2021-11-03 VITALS — DIASTOLIC BLOOD PRESSURE: 56 MMHG | SYSTOLIC BLOOD PRESSURE: 106 MMHG

## 2021-11-03 VITALS — DIASTOLIC BLOOD PRESSURE: 40 MMHG | SYSTOLIC BLOOD PRESSURE: 96 MMHG

## 2021-11-03 VITALS — DIASTOLIC BLOOD PRESSURE: 45 MMHG | SYSTOLIC BLOOD PRESSURE: 110 MMHG

## 2021-11-03 VITALS — SYSTOLIC BLOOD PRESSURE: 102 MMHG | DIASTOLIC BLOOD PRESSURE: 49 MMHG

## 2021-11-03 VITALS — DIASTOLIC BLOOD PRESSURE: 55 MMHG | SYSTOLIC BLOOD PRESSURE: 93 MMHG

## 2021-11-03 VITALS — SYSTOLIC BLOOD PRESSURE: 109 MMHG | DIASTOLIC BLOOD PRESSURE: 51 MMHG

## 2021-11-03 VITALS — DIASTOLIC BLOOD PRESSURE: 56 MMHG | SYSTOLIC BLOOD PRESSURE: 107 MMHG

## 2021-11-03 LAB
ALBUMIN SERPL-MCNC: 2.4 G/DL (ref 3.4–5)
ALBUMIN/GLOB SERPL: 0.6 {RATIO} (ref 1–1.7)
ALP SERPL-CCNC: 91 U/L (ref 46–116)
ALT SERPL-CCNC: 15 U/L (ref 14–59)
ANION GAP SERPL CALC-SCNC: 11 MMOL/L (ref 6–14)
AST SERPL-CCNC: 22 U/L (ref 15–37)
BASOPHILS # BLD AUTO: 0 X10^3/UL (ref 0–0.2)
BASOPHILS NFR BLD: 0 % (ref 0–3)
BILIRUB SERPL-MCNC: 0.5 MG/DL (ref 0.2–1)
BUN SERPL-MCNC: 11 MG/DL (ref 7–20)
BUN/CREAT SERPL: 18 (ref 6–20)
CALCIUM SERPL-MCNC: 8.5 MG/DL (ref 8.5–10.1)
CHLORIDE SERPL-SCNC: 104 MMOL/L (ref 98–107)
CO2 SERPL-SCNC: 26 MMOL/L (ref 21–32)
CREAT SERPL-MCNC: 0.6 MG/DL (ref 0.6–1)
EOSINOPHIL NFR BLD: 0 % (ref 0–3)
EOSINOPHIL NFR BLD: 0 X10^3/UL (ref 0–0.7)
ERYTHROCYTE [DISTWIDTH] IN BLOOD BY AUTOMATED COUNT: 14.4 % (ref 11.5–14.5)
GFR SERPLBLD BASED ON 1.73 SQ M-ARVRAT: 100 ML/MIN
GLUCOSE SERPL-MCNC: 135 MG/DL (ref 70–99)
HCT VFR BLD CALC: 31.3 % (ref 36–47)
HGB BLD-MCNC: 10.3 G/DL (ref 12–15.5)
LYMPHOCYTES # BLD: 0.8 X10^3/UL (ref 1–4.8)
LYMPHOCYTES NFR BLD AUTO: 7 % (ref 24–48)
MCH RBC QN AUTO: 30 PG (ref 25–35)
MCHC RBC AUTO-ENTMCNC: 33 G/DL (ref 31–37)
MCV RBC AUTO: 90 FL (ref 79–100)
MONO #: 0.7 X10^3/UL (ref 0–1.1)
MONOCYTES NFR BLD: 6 % (ref 0–9)
NEUT #: 9.7 X10^3/UL (ref 1.8–7.7)
NEUTROPHILS NFR BLD AUTO: 86 % (ref 31–73)
PLATELET # BLD AUTO: 456 X10^3/UL (ref 140–400)
POTASSIUM SERPL-SCNC: 3.6 MMOL/L (ref 3.5–5.1)
PROT SERPL-MCNC: 6.6 G/DL (ref 6.4–8.2)
RBC # BLD AUTO: 3.48 X10^6/UL (ref 3.5–5.4)
SODIUM SERPL-SCNC: 141 MMOL/L (ref 136–145)
WBC # BLD AUTO: 11.2 X10^3/UL (ref 4–11)

## 2021-11-03 RX ADMIN — ALBUTEROL SULFATE SCH MG: 108 AEROSOL, METERED RESPIRATORY (INHALATION) at 08:02

## 2021-11-03 RX ADMIN — FLUTICASONE PROPIONATE SCH SPRAY: 50 SPRAY, METERED NASAL at 08:49

## 2021-11-03 RX ADMIN — ACETAMINOPHEN SCH MG: 500 TABLET ORAL at 14:50

## 2021-11-03 RX ADMIN — Medication SCH CAP: at 21:04

## 2021-11-03 RX ADMIN — CEFEPIME SCH GM: 2 INJECTION, POWDER, FOR SOLUTION INTRAVENOUS at 08:49

## 2021-11-03 RX ADMIN — HEPARIN SODIUM SCH UNIT: 5000 INJECTION, SOLUTION INTRAVENOUS; SUBCUTANEOUS at 21:09

## 2021-11-03 RX ADMIN — PANTOPRAZOLE SODIUM SCH MG: 40 TABLET, DELAYED RELEASE ORAL at 08:48

## 2021-11-03 RX ADMIN — Medication SCH MG: at 08:49

## 2021-11-03 RX ADMIN — ONDANSETRON SCH MG: 4 TABLET, ORALLY DISINTEGRATING ORAL at 00:00

## 2021-11-03 RX ADMIN — VANCOMYCIN HYDROCHLORIDE PRN EACH: 1 INJECTION, POWDER, LYOPHILIZED, FOR SOLUTION INTRAVENOUS at 06:07

## 2021-11-03 RX ADMIN — ONDANSETRON SCH MG: 4 TABLET, ORALLY DISINTEGRATING ORAL at 18:03

## 2021-11-03 RX ADMIN — BUDESONIDE SCH MG: 0.5 INHALANT RESPIRATORY (INHALATION) at 08:02

## 2021-11-03 RX ADMIN — CETIRIZINE HYDROCHLORIDE SCH MG: 10 TABLET, FILM COATED ORAL at 08:48

## 2021-11-03 RX ADMIN — HEPARIN SODIUM SCH UNIT: 5000 INJECTION, SOLUTION INTRAVENOUS; SUBCUTANEOUS at 12:44

## 2021-11-03 RX ADMIN — ALBUTEROL SULFATE SCH MG: 108 AEROSOL, METERED RESPIRATORY (INHALATION) at 06:48

## 2021-11-03 RX ADMIN — BUDESONIDE SCH MG: 0.5 INHALANT RESPIRATORY (INHALATION) at 20:39

## 2021-11-03 RX ADMIN — LEVOTHYROXINE SODIUM SCH MCG: 0.11 TABLET ORAL at 08:48

## 2021-11-03 RX ADMIN — VANCOMYCIN HYDROCHLORIDE PRN EACH: 1 INJECTION, POWDER, LYOPHILIZED, FOR SOLUTION INTRAVENOUS at 06:54

## 2021-11-03 RX ADMIN — ONDANSETRON SCH MG: 4 TABLET, ORALLY DISINTEGRATING ORAL at 12:42

## 2021-11-03 RX ADMIN — Medication SCH MG: at 18:03

## 2021-11-03 RX ADMIN — ONDANSETRON SCH MG: 2 INJECTION INTRAMUSCULAR; INTRAVENOUS at 12:00

## 2021-11-03 RX ADMIN — MULTIPLE VITAMINS W/ MINERALS TAB SCH TAB: TAB at 08:48

## 2021-11-03 RX ADMIN — MORPHINE SULFATE PRN MG: 2 INJECTION, SOLUTION INTRAMUSCULAR; INTRAVENOUS at 08:26

## 2021-11-03 RX ADMIN — BUDESONIDE SCH MG: 0.5 INHALANT RESPIRATORY (INHALATION) at 06:48

## 2021-11-03 RX ADMIN — TRAZODONE HYDROCHLORIDE SCH MG: 100 TABLET ORAL at 21:04

## 2021-11-03 RX ADMIN — SENNOSIDES AND DOCUSATE SODIUM SCH TAB: 8.6; 5 TABLET ORAL at 08:49

## 2021-11-03 RX ADMIN — DAPTOMYCIN SCH MLS/HR: 500 INJECTION, POWDER, LYOPHILIZED, FOR SOLUTION INTRAVENOUS at 11:17

## 2021-11-03 RX ADMIN — ONDANSETRON SCH MG: 2 INJECTION INTRAMUSCULAR; INTRAVENOUS at 06:00

## 2021-11-03 RX ADMIN — ONDANSETRON SCH MG: 4 TABLET, ORALLY DISINTEGRATING ORAL at 06:40

## 2021-11-03 RX ADMIN — ONDANSETRON SCH MG: 2 INJECTION INTRAMUSCULAR; INTRAVENOUS at 00:00

## 2021-11-03 RX ADMIN — MONTELUKAST SODIUM SCH MG: 10 TABLET, FILM COATED ORAL at 21:04

## 2021-11-03 RX ADMIN — ALBUTEROL SULFATE SCH MG: 108 AEROSOL, METERED RESPIRATORY (INHALATION) at 20:40

## 2021-11-03 RX ADMIN — ONDANSETRON SCH MG: 2 INJECTION INTRAMUSCULAR; INTRAVENOUS at 18:00

## 2021-11-03 RX ADMIN — ACETAMINOPHEN SCH MG: 500 TABLET ORAL at 08:49

## 2021-11-03 RX ADMIN — CEFEPIME SCH GM: 2 INJECTION, POWDER, FOR SOLUTION INTRAVENOUS at 21:04

## 2021-11-03 RX ADMIN — ACETAMINOPHEN SCH MG: 500 TABLET ORAL at 21:04

## 2021-11-03 NOTE — PDOC
Infectious Disease Note


Subjective:


Subjective


Patient is much better today


Patient underwent I&D with polyethylene exchange yesterday


Blood culture positive for staph aureus


Swab culture positive for staph aureus


Denies any fevers


Postop pain is under control


Denies nausea, vomiting, shortness of breath, diarrhea, abdominal pain, rash


Otherwise as above





Vital Signs:


Vital Signs





Vital Signs








  Date Time  Temp Pulse Resp B/P (MAP) Pulse Ox O2 Delivery O2 Flow Rate FiO2


 


11/3/21 08:27      Room Air  


 


11/3/21 08:03     96   


 


11/3/21 07:00 97.8 74 18 110/45 (66)    





 97.8       


 


11/2/21 21:15       6.0 











Physical Exam:


PHYSICAL EXAM


GENERAL:  Alert, oriented x 3, pleasant female, lying in bed comfortably, in no 


acute distress.


HEENT:  Normocephalic, atraumatic.  Anicteric.  No thrush.


NECK:  Supple, no JVD, no lymphadenopathy.


LUNGS:  Clear bilaterally.


HEART:  S1, S2.


ABDOMEN:  Soft, nontender, nondistended.  Bowel sounds present.


EXTREMITIES:  Right lower extremity surgical wound dressing present with drain. 

Not taken down.


Left lower extremity, no calf tenderness, no swelling.


DERMATOLOGIC:  Warm, dry, no generalized rash except for above.


NEUROLOGIC:  Alert, oriented x 3, grossly nonfocal.


PSYCHIATRIC:  Calm and cooperative.  PIV looks clean.





Medications:


Inpatient Meds:


Medications reviewed.





Labs:


Lab





Laboratory Tests








Test


 11/3/21


07:10


 


Creatinine


 0.7 mg/dL


(0.6-1.0)


 


Estimated GFR


(Cockcroft-Gault) 83.7 














Objective:


Assessment:





Right TKA PJI 


ESR &@ [id number]


November 2, 2021


Status post I&D and polyethylene exchange with hardware retention


Intraoperative findings noted


"Purulence throughout the subcutaneous as well as intra-articular portion of the

knee consistent with acute postoperative periprosthetic joint infection.  

Cultures were taken intraoperatively"


Swab cultures staph aureus


Bacteremia staph aureus present on admission





2.   Right knee arthroplasty on 10/12 for DJD


3.  Leukocytosis.


4.  History of asthma.


5.  Hypothyroidism.


6.  HISTORY OF ALLERGIES TO SULFA.


7.  Anemia.





Plan:


Plan of Care





1.  Continue cefepime.


2.  Change IV vancomycin to daptomycin, monitor CK closely


3.  Repeat labs today CBC and CMP.  Repeat blood cultures this a.m.


4.  Monitor labs and cultures.


5.  Continue supportive care.


6.  We will modify treatment depending on culture results.


7.  PT and OT as directed.


8.  Wound care as directed.


9.  Do not place PICC line until repeat blood cultures are clear for at least 48

to 72 hours.











JOYCE LYONS MD            Nov 3, 2021 08:41

## 2021-11-03 NOTE — PDOC
Provider Note


Date of Service:


DATE: 11/3/21 


TIME: 18:53


Provider Note


Patient seen and evaluated today in room 404.  She resting comfortably in bed.  

Rates her pain at 4 out of 10.





Vital signs are stable patient is afebrile


WBC down to 11 today from 16.


MRSA positive from wound culture


Pending intraoperative culture





Right knee is clean dry and intact with dressing.  Mild tenderness to palpation.

 Drain is been removed.





Diagnosis:


Status post right knee open drainage with irrigation and subtotal synovectomy 

and polyethylene exchange secondary to periprosthetic joint infection acute





Patient can weight-bear as tolerated.  Maintain current dressing.  We will await

final cultures likely presumed MRSA but will wait for final cultures and 

sensitivity.  Appreciate infectious disease and internal medicine input.





Justifications for Admission


Other Justification














JOHNNY MORALES DO        Nov 3, 2021 18:56

## 2021-11-04 VITALS — SYSTOLIC BLOOD PRESSURE: 91 MMHG | DIASTOLIC BLOOD PRESSURE: 55 MMHG

## 2021-11-04 VITALS — SYSTOLIC BLOOD PRESSURE: 115 MMHG | DIASTOLIC BLOOD PRESSURE: 51 MMHG

## 2021-11-04 VITALS — DIASTOLIC BLOOD PRESSURE: 58 MMHG | SYSTOLIC BLOOD PRESSURE: 97 MMHG

## 2021-11-04 VITALS — DIASTOLIC BLOOD PRESSURE: 53 MMHG | SYSTOLIC BLOOD PRESSURE: 124 MMHG

## 2021-11-04 VITALS — SYSTOLIC BLOOD PRESSURE: 109 MMHG | DIASTOLIC BLOOD PRESSURE: 60 MMHG

## 2021-11-04 VITALS — SYSTOLIC BLOOD PRESSURE: 99 MMHG | DIASTOLIC BLOOD PRESSURE: 43 MMHG

## 2021-11-04 LAB
CK SERPL-CCNC: 139 U/L (ref 26–192)
CREAT SERPL-MCNC: 0.8 MG/DL (ref 0.6–1)
GFR SERPLBLD BASED ON 1.73 SQ M-ARVRAT: 71.8 ML/MIN
HCT VFR BLD CALC: 27.7 % (ref 36–47)
HGB BLD-MCNC: 9 G/DL (ref 12–15.5)
MCHC RBC AUTO-ENTMCNC: 33 G/DL (ref 31–37)

## 2021-11-04 RX ADMIN — MONTELUKAST SODIUM SCH MG: 10 TABLET, FILM COATED ORAL at 22:03

## 2021-11-04 RX ADMIN — Medication SCH CAP: at 09:15

## 2021-11-04 RX ADMIN — BUDESONIDE SCH MG: 0.5 INHALANT RESPIRATORY (INHALATION) at 06:57

## 2021-11-04 RX ADMIN — Medication SCH CAP: at 22:03

## 2021-11-04 RX ADMIN — TRAZODONE HYDROCHLORIDE SCH MG: 100 TABLET ORAL at 22:04

## 2021-11-04 RX ADMIN — HEPARIN SODIUM SCH UNIT: 5000 INJECTION, SOLUTION INTRAVENOUS; SUBCUTANEOUS at 22:15

## 2021-11-04 RX ADMIN — HEPARIN SODIUM SCH UNIT: 5000 INJECTION, SOLUTION INTRAVENOUS; SUBCUTANEOUS at 05:59

## 2021-11-04 RX ADMIN — MORPHINE SULFATE PRN MG: 2 INJECTION, SOLUTION INTRAMUSCULAR; INTRAVENOUS at 08:09

## 2021-11-04 RX ADMIN — MULTIPLE VITAMINS W/ MINERALS TAB SCH TAB: TAB at 09:16

## 2021-11-04 RX ADMIN — ACETAMINOPHEN SCH MG: 500 TABLET ORAL at 15:30

## 2021-11-04 RX ADMIN — CEFEPIME SCH GM: 2 INJECTION, POWDER, FOR SOLUTION INTRAVENOUS at 09:18

## 2021-11-04 RX ADMIN — SENNOSIDES AND DOCUSATE SODIUM SCH TAB: 8.6; 5 TABLET ORAL at 09:15

## 2021-11-04 RX ADMIN — PANTOPRAZOLE SODIUM SCH MG: 40 TABLET, DELAYED RELEASE ORAL at 08:08

## 2021-11-04 RX ADMIN — ACETAMINOPHEN SCH MG: 500 TABLET ORAL at 09:16

## 2021-11-04 RX ADMIN — HEPARIN SODIUM SCH UNIT: 5000 INJECTION, SOLUTION INTRAVENOUS; SUBCUTANEOUS at 14:05

## 2021-11-04 RX ADMIN — DAPTOMYCIN SCH MLS/HR: 500 INJECTION, POWDER, LYOPHILIZED, FOR SOLUTION INTRAVENOUS at 11:21

## 2021-11-04 RX ADMIN — Medication SCH MG: at 17:35

## 2021-11-04 RX ADMIN — ALBUTEROL SULFATE SCH MG: 108 AEROSOL, METERED RESPIRATORY (INHALATION) at 06:57

## 2021-11-04 RX ADMIN — BUDESONIDE SCH MG: 0.5 INHALANT RESPIRATORY (INHALATION) at 21:19

## 2021-11-04 RX ADMIN — MORPHINE SULFATE PRN MG: 2 INJECTION, SOLUTION INTRAMUSCULAR; INTRAVENOUS at 19:46

## 2021-11-04 RX ADMIN — Medication SCH MG: at 09:16

## 2021-11-04 RX ADMIN — ALBUTEROL SULFATE SCH MG: 108 AEROSOL, METERED RESPIRATORY (INHALATION) at 21:19

## 2021-11-04 RX ADMIN — FLUTICASONE PROPIONATE SCH SPRAY: 50 SPRAY, METERED NASAL at 09:00

## 2021-11-04 RX ADMIN — ACETAMINOPHEN SCH MG: 500 TABLET ORAL at 22:04

## 2021-11-04 RX ADMIN — LEVOTHYROXINE SODIUM SCH MCG: 0.11 TABLET ORAL at 08:08

## 2021-11-04 RX ADMIN — CETIRIZINE HYDROCHLORIDE SCH MG: 10 TABLET, FILM COATED ORAL at 09:15

## 2021-11-04 RX ADMIN — ACETAMINOPHEN SCH MG: 500 TABLET ORAL at 02:50

## 2021-11-04 NOTE — PDOC
Infectious Disease Note


Subjective:


Subjective


Patient without complaints


Yesterday had postop site pain so was not able to undergo PT and OT


Denies any fevers


Denies nausea, vomiting, shortness of breath, diarrhea, abdominal pain, rash


Otherwise as above





Vital Signs:


Vital Signs





Vital Signs








  Date Time  Temp Pulse Resp B/P (MAP) Pulse Ox O2 Delivery O2 Flow Rate FiO2


 


11/4/21 08:09      Room Air  


 


11/4/21 07:00 98.1 16 16 91/55 (67) 95   





 98.1       











Physical Exam:


PHYSICAL EXAM


GENERAL:  Alert, oriented x 3, pleasant female, lying in bed comfortably, in no 


acute distress.


HEENT:  Normocephalic, atraumatic.  Anicteric.  No thrush.


NECK:  Supple, no JVD, no lymphadenopathy.


LUNGS:  Clear bilaterally.


HEART:  S1, S2.


ABDOMEN:  Soft, nontender, nondistended.  Bowel sounds present.


EXTREMITIES:  Right lower extremity surgical wound dressing present with Prevena

wound.  Drain removed..  Not taken down.


Left lower extremity, no calf tenderness, no swelling.


DERMATOLOGIC:  Warm, dry, no generalized rash except for above.


NEUROLOGIC:  Alert, oriented x 3, grossly nonfocal.


PSYCHIATRIC:  Calm and cooperative.  PIV looks clean.





Medications:


Inpatient Meds:


Medications reviewed.





Labs:


Lab





Laboratory Tests








Test


 11/4/21


04:40


 


Hemoglobin


 9.0 g/dL


(12.0-15.5)


 


Hematocrit


 27.7 %


(36.0-47.0)


 


Mean Corpuscular Hemoglobin


Concent 33 g/dL


(31-37)


 


Creatinine


 0.8 mg/dL


(0.6-1.0)


 


Estimated GFR


(Cockcroft-Gault) 71.8 





 


Creatine Kinase


 139 U/L


()











Objective:


Assessment:





Right TKA PJI 


ESR & [id number]


November 2, 2021


Status post I&D and polyethylene exchange with hardware retention


Intraoperative findings noted


"Purulence throughout the subcutaneous as well as intra-articular portion of the

knee consistent with acute postoperative periprosthetic joint infection.  

Cultures were taken intraoperatively"


Swab cultures staph aureus, MRSA





Bacteremia s MRSA present on admission; source right TKA PJI





2.   Right knee arthroplasty on 10/12 for DJD


3.  Leukocytosis.


4.  History of asthma.


5.  Hypothyroidism.


6.  HISTORY OF ALLERGIES TO SULFA.


7.  Anemia.





Plan:


Plan of Care


Continue daptomycin, CK normal 11/3/2021


DC cefepime


Follow-up repeat blood cultures


Follow-up intraoperative cultures


Monitor labs and cultures


Wound/drain management as directed


PT and OT as directed


  Do not place PICC line until repeat blood cultures are clear for at least 48 

to 72 hours.





Tentative discharge Saturday if stable and repeat blood cultures remain negative


Discussed with patient,  and daughter at bedside





Discussed with JOYCE MA MD            Nov 4, 2021 08:54

## 2021-11-04 NOTE — PDOC
[employer] [facility] PROGRESS NOTE


Date of Service


DOS:


DATE: 11/4/21 


TIME: 12:53





Chief Complaint


Chief Complaint


Wound dehiscence, history of allergic rhinitis asthma hypothyroidism, MRSA 

bacteremia





-Patient underwent right TKA on October 12 initially healed well.  Over the past

3 days noticed some wound dehiscence and increased drainage


-Presented emergency room today and there was concern for wound infection


-Blood cultures and wound cultures obtained emergency room.  Received 1 dose 

vancomycin in emergency room


 Infectious disease consulted.  Continue Dapto.  DC cefepime


-Diet as tolerated


-She reports that morphine and Dilaudid have really not been helping with pain. 

Try oral pain meds


-Orthopedic team performed I&D 11/2


-Home meds resumed as indicated.





History of Present Illness


History of Present Illness





Patient is 66-year-old female presented the emergency room today due to 3-day 

history of wound concerns.  Patient underwent a right TKA on October 12 and 

initially did well postop in terms of wound healing.  Said she was seen in 

clinic on the 22nd and wound was healing well then.  However over the past 3 

days she is noticed that the proximal parts of the surgical incision started 

"opening up."  Also noticed increased drainage from the wound as well.  She 

denies any sort of trauma to the site any sort of excessive movement.  Reports 

some generalized unwell feeling also.


I examined her patient was in a fair amount of pain.  She could not sit still 

she was uncomfortable.  She was really denying any sort of other symptoms includ

ing headache, vision change, dizziness, chest pain, shortness of breath, 

abdominal pain, dysuria.





11/2


Patient evaluated and examined at bedside.  Said her pain was pretty well 

controlled overnight.  Planning for surgery today.  Remains on vancomycin cefe

pime and Flagyl.  Continue for now.  Please obtain cultures from surgery.  PT OT

ordered.  Plan of care discussed with bedside RN.





11/3


Patient evaluated examined at bedside.  She was doing well says her pain is well

controlled.  Tolerated surgery well.  MRSA positive in blood and wound.  

Continue Dapto cefepime.  Plan of care discussed with bedside RN.





11/4


Patient evaluated examined at bedside.  Was resting in bed.  Says her pain had 

improved.  Tolerating antibiotics well.  Appears that she is gone need a long-

term course of daptomycin.  Continue for now.  Hold off PICC line until repeat 

cultures negative.  Plan of care discussed bedside RN





Vitals/I&O


Vitals/I&O:





                                   Vital Signs








  Date Time  Temp Pulse Resp B/P (MAP) Pulse Ox O2 Delivery O2 Flow Rate FiO2


 


11/4/21 11:00 98.2 81 16 97/58 (71) 98 Room Air  





 98.2       


 


11/4/21 09:32       6.0 














                                    I & O   


 


 11/3/21 11/3/21 11/4/21





 15:00 23:00 07:00


 


Intake Total   100 ml


 


Balance   100 ml











Physical Exam


Physical Exam:


GENERAL:  Alert, oriented x 3, pleasant female, lying in bed comfortably, in no 


acute distress.


HEENT:  Normocephalic, atraumatic.  Anicteric.  No thrush.


NECK:  Supple, no JVD, no lymphadenopathy.


LUNGS:  Clear bilaterally.


HEART:  S1, S2.


ABDOMEN:  Soft, nontender, nondistended.  Bowel sounds present.


EXTREMITIES:  Right lower extremity surgical wound dressing present with Prevena

wound.  Drain removed..  Not taken down.


Left lower extremity, no calf tenderness, no swelling.


DERMATOLOGIC:  Warm, dry, no generalized rash except for above.


NEUROLOGIC:  Alert, oriented x 3, grossly nonfocal.


PSYCHIATRIC:  Calm and cooperative.  PIV looks clean.


General:  Alert, Oriented X3, Cooperative


Heart:  Regular rate, Normal S1, Normal S2


Lungs:  Clear


Abdomen:  Normal bowel sounds, Soft, No tenderness


Extremities:  No edema, Normal pulses


Skin:  Other (Proximal portion of right knee incision with serosanguineous 

drainage)





Labs


Labs:





Laboratory Tests








Test


 11/4/21


04:40


 


Hemoglobin


 9.0 g/dL


(12.0-15.5)


 


Hematocrit


 27.7 %


(36.0-47.0)


 


Mean Corpuscular Hemoglobin


Concent 33 g/dL


(31-37)


 


Creatinine


 0.8 mg/dL


(0.6-1.0)


 


Estimated GFR


(Cockcroft-Gault) 71.8 





 


Creatine Kinase


 139 U/L


()











Assessment and Plan


Assessmemt and Plan


Problems


Medical Problems:


(1) Wound infection after surgery


Status: Acute  











Comment


Review of Relevant


I have reviewed the following items venancio (where applicable) has been applied.


Medications:





Current Medications








 Medications


  (Trade)  Dose


 Ordered  Sig/Tuan


 Route


 PRN Reason  Start Time


 Stop Time Status Last Admin


Dose Admin


 


 Albuterol Sulfate


  (Ventolin Neb


 Soln)  2.5 mg  BID


 NEB


   11/3/21 21:00


    11/4/21 06:57





 


 Budesonide


  (Pulmicort)  0.5 mg  RTBID


 NEB


   11/3/21 20:00


    11/4/21 06:57





 


 Lactobacillus


 Rhamnosus


  (Culturelle)  1 cap  BID


 PO


   11/3/21 21:00


    11/4/21 09:15














Justifications for Admission


Other Justification














JOHNNY BENNETT MD          Nov 4, 2021 12:55

## 2021-11-04 NOTE — NUR
SW following. Discussed with RN, pt from home with spouse, room air, regular diet. OT 
recommending outpatient therapy. Per ID note - awaiting repeat blood cultures with tentative 
DC plan for Saturday pending results. SW awaiting return call from RN. SW will continue to 
follow.

## 2021-11-05 VITALS — DIASTOLIC BLOOD PRESSURE: 62 MMHG | SYSTOLIC BLOOD PRESSURE: 115 MMHG

## 2021-11-05 VITALS — DIASTOLIC BLOOD PRESSURE: 61 MMHG | SYSTOLIC BLOOD PRESSURE: 109 MMHG

## 2021-11-05 VITALS — DIASTOLIC BLOOD PRESSURE: 59 MMHG | SYSTOLIC BLOOD PRESSURE: 117 MMHG

## 2021-11-05 VITALS — DIASTOLIC BLOOD PRESSURE: 65 MMHG | SYSTOLIC BLOOD PRESSURE: 100 MMHG

## 2021-11-05 VITALS — DIASTOLIC BLOOD PRESSURE: 49 MMHG | SYSTOLIC BLOOD PRESSURE: 105 MMHG

## 2021-11-05 LAB
HCT VFR BLD CALC: 28.2 % (ref 36–47)
HGB BLD-MCNC: 9.4 G/DL (ref 12–15.5)
MCHC RBC AUTO-ENTMCNC: 33 G/DL (ref 31–37)

## 2021-11-05 RX ADMIN — FLUTICASONE PROPIONATE SCH SPRAY: 50 SPRAY, METERED NASAL at 08:11

## 2021-11-05 RX ADMIN — BUDESONIDE SCH MG: 0.5 INHALANT RESPIRATORY (INHALATION) at 07:15

## 2021-11-05 RX ADMIN — HEPARIN SODIUM SCH UNIT: 5000 INJECTION, SOLUTION INTRAVENOUS; SUBCUTANEOUS at 06:07

## 2021-11-05 RX ADMIN — MONTELUKAST SODIUM SCH MG: 10 TABLET, FILM COATED ORAL at 21:32

## 2021-11-05 RX ADMIN — CETIRIZINE HYDROCHLORIDE SCH MG: 10 TABLET, FILM COATED ORAL at 07:54

## 2021-11-05 RX ADMIN — Medication SCH CAP: at 21:32

## 2021-11-05 RX ADMIN — TRAZODONE HYDROCHLORIDE SCH MG: 100 TABLET ORAL at 21:32

## 2021-11-05 RX ADMIN — Medication SCH MG: at 07:54

## 2021-11-05 RX ADMIN — HEPARIN SODIUM SCH UNIT: 5000 INJECTION, SOLUTION INTRAVENOUS; SUBCUTANEOUS at 15:02

## 2021-11-05 RX ADMIN — ALBUTEROL SULFATE SCH MG: 108 AEROSOL, METERED RESPIRATORY (INHALATION) at 21:16

## 2021-11-05 RX ADMIN — ACETAMINOPHEN SCH MG: 500 TABLET ORAL at 14:52

## 2021-11-05 RX ADMIN — ACETAMINOPHEN SCH MG: 500 TABLET ORAL at 21:33

## 2021-11-05 RX ADMIN — ACETAMINOPHEN SCH MG: 500 TABLET ORAL at 07:56

## 2021-11-05 RX ADMIN — BUDESONIDE SCH MG: 0.5 INHALANT RESPIRATORY (INHALATION) at 21:16

## 2021-11-05 RX ADMIN — ONDANSETRON PRN MG: 2 INJECTION INTRAMUSCULAR; INTRAVENOUS at 08:27

## 2021-11-05 RX ADMIN — ACETAMINOPHEN SCH MG: 500 TABLET ORAL at 03:00

## 2021-11-05 RX ADMIN — Medication SCH MG: at 17:00

## 2021-11-05 RX ADMIN — LEVOTHYROXINE SODIUM SCH MCG: 0.11 TABLET ORAL at 07:54

## 2021-11-05 RX ADMIN — HEPARIN SODIUM SCH UNIT: 5000 INJECTION, SOLUTION INTRAVENOUS; SUBCUTANEOUS at 21:42

## 2021-11-05 RX ADMIN — Medication SCH CAP: at 07:54

## 2021-11-05 RX ADMIN — SENNOSIDES AND DOCUSATE SODIUM SCH TAB: 8.6; 5 TABLET ORAL at 07:55

## 2021-11-05 RX ADMIN — MULTIPLE VITAMINS W/ MINERALS TAB SCH TAB: TAB at 07:55

## 2021-11-05 RX ADMIN — DAPTOMYCIN SCH MLS/HR: 500 INJECTION, POWDER, LYOPHILIZED, FOR SOLUTION INTRAVENOUS at 11:00

## 2021-11-05 RX ADMIN — PANTOPRAZOLE SODIUM SCH MG: 40 TABLET, DELAYED RELEASE ORAL at 07:55

## 2021-11-05 RX ADMIN — ALBUTEROL SULFATE SCH MG: 108 AEROSOL, METERED RESPIRATORY (INHALATION) at 07:15

## 2021-11-05 NOTE — PDOC
[facility] [facility] PROGRESS NOTE


Date of Service


DOS:


DATE: 11/5/21 


TIME: 20:54





Chief Complaint


Chief Complaint


Wound dehiscence, history of allergic rhinitis asthma hypothyroidism, MRSA 

bacteremia





-Patient underwent right TKA on October 12 initially healed well.  Over the past

3 days noticed some wound dehiscence and increased drainage


-Presented emergency room today and there was concern for wound infection


-Blood cultures and wound cultures obtained emergency room.  Received 1 dose 

vancomycin in emergency room


 Infectious disease consulted.  Continue Dapto.  DC cefepime


-Diet as tolerated


-She reports that morphine and Dilaudid have really not been helping with pain. 

Try oral pain meds


-Orthopedic team performed I&D 11/2


-Home meds resumed as indicated.





History of Present Illness


History of Present Illness





Patient is 66-year-old female presented the emergency room today due to 3-day 

history of wound concerns.  Patient underwent a right TKA on October 12 and 

initially did well postop in terms of wound healing.  Said she was seen in 

clinic on the 22nd and wound was healing well then.  However over the past 3 

days she is noticed that the proximal parts of the surgical incision started 

"opening up."  Also noticed increased drainage from the wound as well.  She 

denies any sort of trauma to the site any sort of excessive movement.  Reports 

some generalized unwell feeling also.


I examined her patient was in a fair amount of pain.  She could not sit still 

she was uncomfortable.  She was really denying any sort of other symptoms includ

ing headache, vision change, dizziness, chest pain, shortness of breath, 

abdominal pain, dysuria.





11/2


Patient evaluated and examined at bedside.  Said her pain was pretty well 

controlled overnight.  Planning for surgery today.  Remains on vancomycin cefe

pime and Flagyl.  Continue for now.  Please obtain cultures from surgery.  PT OT

ordered.  Plan of care discussed with bedside RN.





11/3


Patient evaluated examined at bedside.  She was doing well says her pain is well

controlled.  Tolerated surgery well.  MRSA positive in blood and wound.  

Continue Dapto cefepime.  Plan of care discussed with bedside RN.





11/4


Patient evaluated examined at bedside.  Was resting in bed.  Says her pain had 

improved.  Tolerating antibiotics well.  Appears that she is gone need a long-

term course of daptomycin.  Continue for now.  Hold off PICC line until repeat 

cultures negative.  Plan of care discussed bedside RN





1/5


Patient evaluated and examined at bedside.  She was not doing very well today 

complaining of a fair bit of pain.  Unable to participate in PT OT.  Will 

evaluate pain medications.  Continue IV antibiotics.  Plan of care discussed 

with bedside RN.





Vitals/I&O


Vitals/I&O:





                                   Vital Signs








  Date Time  Temp Pulse Resp B/P (MAP) Pulse Ox O2 Delivery O2 Flow Rate FiO2


 


11/5/21 19:40      Room Air  


 


11/5/21 19:29 97.5 76 16 117/59 (78) 95   





 97.5       


 


11/5/21 08:00       6.0 














                                    I & O   


 


 11/4/21 11/4/21 11/5/21





 15:00 23:00 07:00


 


Intake Total 50 ml 100 ml 100 ml


 


Balance 50 ml 100 ml 100 ml











Physical Exam


Physical Exam:


GENERAL:  Alert, oriented x 3, pleasant female, lying in bed comfortably, in no 


acute distress.


HEENT:  Normocephalic, atraumatic.  Anicteric.  No thrush.


NECK:  Supple, no JVD, no lymphadenopathy.


LUNGS:  Clear bilaterally.


HEART:  S1, S2.


ABDOMEN:  Soft, nontender, nondistended.  Bowel sounds present.


EXTREMITIES:  Right lower extremity surgical wound dressing present with Prevena

wound.  Drain removed..  Not taken down.


Left lower extremity, no calf tenderness, no swelling.


DERMATOLOGIC:  Warm, dry, no generalized rash except for above.


NEUROLOGIC:  Alert, oriented x 3, grossly nonfocal.


PSYCHIATRIC:  Calm and cooperative.  PIV looks clean.


General:  Alert, Oriented X3, Cooperative


Heart:  Regular rate, Normal S1, Normal S2


Lungs:  Clear


Abdomen:  Normal bowel sounds, Soft, No tenderness


Extremities:  No edema, Normal pulses


Skin:  Other (Proximal portion of right knee incision with serosanguineous 

drainage)





Labs


Labs:





Laboratory Tests








Test


 11/5/21


03:45


 


Hemoglobin


 9.4 g/dL


(12.0-15.5)


 


Hematocrit


 28.2 %


(36.0-47.0)


 


Mean Corpuscular Hemoglobin


Concent 33 g/dL


(31-37)











Assessment and Plan


Assessmemt and Plan


Problems


Medical Problems:


(1) Wound infection after surgery


Status: Acute  











Comment


Review of Relevant


I have reviewed the following items venancio (where applicable) has been applied.


Medications:





Current Medications








 Medications


  (Trade)  Dose


 Ordered  Sig/Tuan


 Route


 PRN Reason  Start Time


 Stop Time Status Last Admin


Dose Admin


 


 Lorazepam


  (Ativan Intensol)  2 mg  1X  STAT


 SL


   11/5/21 12:44


 11/5/21 12:49 DC 11/5/21 12:44














Justifications for Admission


Other Justification














JOHNNY BENNETT MD          Nov 5, 2021 20:55

## 2021-11-05 NOTE — PDOC
Infectious Disease Note


Subjective:


Subjective


Patient is not feeling well today.


Has nausea.  Has postop site pain.  


Was not able to undergo PT and OT.


Sister at bedside





ROS:


ROS


Denies fevers, chills nausea, vomiting, shortness of breath, diarrhea, abdominal

pain, rash





Vital Signs:


Vital Signs





Vital Signs








  Date Time  Temp Pulse Resp B/P (MAP) Pulse Ox O2 Delivery O2 Flow Rate FiO2


 


11/5/21 07:16     98 Room Air  


 


11/5/21 03:00 97.5 76 18 109/61 (77)    





 97.5       


 


11/4/21 17:36       6.0 











Physical Exam:


PHYSICAL EXAM


GENERAL:  Alert, oriented x 3, pleasant female, lying in bed comfortably, in no 


acute distress.


HEENT:  Normocephalic, atraumatic.  Anicteric.  No thrush.


NECK:  Supple, no JVD, no lymphadenopathy.


LUNGS:  Clear bilaterally.


HEART:  S1, S2.


ABDOMEN:  Soft, nontender, nondistended.  Bowel sounds present.


EXTREMITIES:  Right lower extremity surgical wound dressing present with Prevena

wound.  Drain removed..  Not taken down.


Left lower extremity, no calf tenderness, no swelling.


DERMATOLOGIC:  Warm, dry, no generalized rash except for above.


NEUROLOGIC:  Alert, oriented x 3, grossly nonfocal.


PSYCHIATRIC:  Calm and cooperative.  PIV looks clean.





Medications:


Inpatient Meds:


Medications reviewed.





Labs:


Lab





Laboratory Tests








Test


 11/5/21


03:45


 


Hemoglobin


 9.4 g/dL


(12.0-15.5)


 


Hematocrit


 28.2 %


(36.0-47.0)


 


Mean Corpuscular Hemoglobin


Concent 33 g/dL


(31-37)











Objective:


Assessment:





Right TKA PJI 


ESR & [id number]


Swab cultures positive for MRSA


November 2, 2021


Status post I&D and polyethylene exchange with hardware retention


Intraoperative findings noted


"Purulence throughout the subcutaneous as well as intra-articular portion of the

knee consistent with acute postoperative periprosthetic joint infection.  

Cultures were taken intraoperatively"


Intraoperative cultures staph aureus





Bacteremia  MRSA present on admission; source right TKA PJI


Repeat blood cultures are negative from 11/3/2021





2.   Right knee arthroplasty on 10/12 for DJD


3.  Leukocytosis.


4.  History of asthma.


5.  Hypothyroidism.


6.  HISTORY OF ALLERGIES TO SULFA.


7.  Anemia.





Plan:


Plan of Care


Continue daptomycin 8 mg/kg daily, CK normal 11/3/2021


Follow-up intraoperative cultures until final for staph aureus


Follow-up repeat blood cultures negative so far


Monitor labs and cultures


Wound/drain management as directed


PT and OT as directed


PICC line to be placed later this p.m. if repeat blood cultures remain negative


Maintain aspiration precautions


Pain control per orthopedics


Discussed with sister at bedside











Discussed with JOYCE MA MD            Nov 5, 2021 09:40

## 2021-11-05 NOTE — NUR
Allergies: Sulfa



BUN 11    Cr 0.8   Platelets 456

 

Blood culture done Yes

blood culture results  No growth

 

Order Verified Y

 

Consent signed  Y

 

Previous PICC placement  N

 

Past Medical/Surgical history and current diagnosis reviewed Y

 

Patient Medical /Surgical History Related to PICC line placement 

Infectious Disease consult



 

Special considerations for PICC line placement 

None



 

 

PICC placement indication   

Caustic medication class drug usage,

Long term antibiotic usage, 

Multiple/ Frequent blood draws, 

Poor peripheral intravenous access 



     Kristine CANTU

## 2021-11-05 NOTE — NUR
SW following. Discussed with RN, pt from home with spouse, room air, regular diet. PICC line 
to be placed 48-72 hours after blood cultures come back, per RN. Pt will likely be here 
through the weekend. SW will continue to follow.

## 2021-11-05 NOTE — NUR
Procedure:  Following complete explanation of the PICC procedure including the indications, 
risks, and potential complications, informed consent was obtained.

 The possibility for infection was discussed along with signs, symptoms, and prevention. All 
the

 questions were answered.





 Written and verbal patient education was provided. 





Hand hygiene performed. 





Standardized central line checklist was utilized. 





The patient was placed in the supine position, the arm was prepped with chlorhexidine and 
patient draped with maximum sterile barrier.  4 mL 1% lidocaine was infiltrated into the 
skin to provide local anesthesia.  A thorough assessment of RIGHT upper extremity completed. 
 Using real-time ultrasound guidance and standardized micro puncture set, the BRACHIAL vein 
was punctured and a peel away sheath was placed using the modified Seldinger technique. A 
tip location device was used to ensure adequate catheter placement.





The catheter was secured using a securement device and an antimicrobial patch was applied 
directly on the insertion site followed by a transparent dressing.  All ports withdraw blood 
and flush without resistance.





Patient tolerated the procedure without apparent complication(s). SINGLE Lumen Power PICC 
placement successful and uncomplicated.  Placement verified by EKG tip confirmation system 
and/or chest x-ray.  Tip located in the CAJ



Complications:

NONE

## 2021-11-05 NOTE — RAD
Portable chest x-ray without comparison for PICC line placement.



FINDINGS: There is a right PICC line with the distal tip in the superior vena cava. This appears suit
able for use. Subtle subsegmental atelectasis in the left lung base. No other significant lung parenc
hymal abnormality. Heart size mildly enlarged. Right shoulder prosthesis.



IMPRESSION:

1. PICC line with the distal tip in the superior vena cava, suitable for use.



Electronically signed by: Crescencio Johns MD (11/5/2021 2:39 PM) [id number]

## 2021-11-06 VITALS — DIASTOLIC BLOOD PRESSURE: 51 MMHG | SYSTOLIC BLOOD PRESSURE: 100 MMHG

## 2021-11-06 VITALS — SYSTOLIC BLOOD PRESSURE: 112 MMHG | DIASTOLIC BLOOD PRESSURE: 58 MMHG

## 2021-11-06 VITALS — DIASTOLIC BLOOD PRESSURE: 69 MMHG | SYSTOLIC BLOOD PRESSURE: 129 MMHG

## 2021-11-06 VITALS — DIASTOLIC BLOOD PRESSURE: 50 MMHG | SYSTOLIC BLOOD PRESSURE: 109 MMHG

## 2021-11-06 VITALS — SYSTOLIC BLOOD PRESSURE: 123 MMHG | DIASTOLIC BLOOD PRESSURE: 39 MMHG

## 2021-11-06 VITALS — DIASTOLIC BLOOD PRESSURE: 59 MMHG | SYSTOLIC BLOOD PRESSURE: 104 MMHG

## 2021-11-06 LAB
HCT VFR BLD CALC: 28.2 % (ref 36–47)
HGB BLD-MCNC: 9.5 G/DL (ref 12–15.5)
MCHC RBC AUTO-ENTMCNC: 34 G/DL (ref 31–37)

## 2021-11-06 RX ADMIN — HEPARIN SODIUM SCH UNIT: 5000 INJECTION, SOLUTION INTRAVENOUS; SUBCUTANEOUS at 13:21

## 2021-11-06 RX ADMIN — DAPTOMYCIN SCH MLS/HR: 500 INJECTION, POWDER, LYOPHILIZED, FOR SOLUTION INTRAVENOUS at 11:25

## 2021-11-06 RX ADMIN — MULTIPLE VITAMINS W/ MINERALS TAB SCH TAB: TAB at 08:06

## 2021-11-06 RX ADMIN — ACETAMINOPHEN SCH MG: 500 TABLET ORAL at 17:27

## 2021-11-06 RX ADMIN — ACETAMINOPHEN SCH MG: 500 TABLET ORAL at 21:49

## 2021-11-06 RX ADMIN — BUDESONIDE SCH MG: 0.5 INHALANT RESPIRATORY (INHALATION) at 18:08

## 2021-11-06 RX ADMIN — TRAZODONE HYDROCHLORIDE SCH MG: 100 TABLET ORAL at 21:49

## 2021-11-06 RX ADMIN — Medication SCH CAP: at 21:48

## 2021-11-06 RX ADMIN — ACETAMINOPHEN SCH MG: 500 TABLET ORAL at 08:05

## 2021-11-06 RX ADMIN — HEPARIN SODIUM SCH UNIT: 5000 INJECTION, SOLUTION INTRAVENOUS; SUBCUTANEOUS at 06:18

## 2021-11-06 RX ADMIN — BUDESONIDE SCH MG: 0.5 INHALANT RESPIRATORY (INHALATION) at 06:05

## 2021-11-06 RX ADMIN — ALBUTEROL SULFATE SCH MG: 108 AEROSOL, METERED RESPIRATORY (INHALATION) at 06:05

## 2021-11-06 RX ADMIN — LEVOTHYROXINE SODIUM SCH MCG: 0.11 TABLET ORAL at 08:05

## 2021-11-06 RX ADMIN — ACETAMINOPHEN SCH MG: 500 TABLET ORAL at 03:00

## 2021-11-06 RX ADMIN — Medication SCH CAP: at 08:05

## 2021-11-06 RX ADMIN — Medication SCH MG: at 08:05

## 2021-11-06 RX ADMIN — MONTELUKAST SODIUM SCH MG: 10 TABLET, FILM COATED ORAL at 21:49

## 2021-11-06 RX ADMIN — Medication SCH MG: at 17:27

## 2021-11-06 RX ADMIN — SENNOSIDES AND DOCUSATE SODIUM SCH TAB: 8.6; 5 TABLET ORAL at 08:06

## 2021-11-06 RX ADMIN — ALBUTEROL SULFATE SCH MG: 108 AEROSOL, METERED RESPIRATORY (INHALATION) at 18:11

## 2021-11-06 RX ADMIN — HEPARIN SODIUM SCH UNIT: 5000 INJECTION, SOLUTION INTRAVENOUS; SUBCUTANEOUS at 21:54

## 2021-11-06 RX ADMIN — CETIRIZINE HYDROCHLORIDE SCH MG: 10 TABLET, FILM COATED ORAL at 08:05

## 2021-11-06 RX ADMIN — FLUTICASONE PROPIONATE SCH SPRAY: 50 SPRAY, METERED NASAL at 08:07

## 2021-11-06 RX ADMIN — PANTOPRAZOLE SODIUM SCH MG: 40 TABLET, DELAYED RELEASE ORAL at 08:05

## 2021-11-06 NOTE — PDOC
Infectious Disease Note


Subjective:


Subjective


Patient is  feeling well today.


still has postop site pain


no f/n/v/d





Vital Signs:


Vital Signs





Vital Signs








  Date Time  Temp Pulse Resp B/P (MAP) Pulse Ox O2 Delivery O2 Flow Rate FiO2


 


11/6/21 12:09      Room Air  


 


11/6/21 11:00 97.6 71 19 112/58 (76) 97   





 97.6       


 


11/5/21 08:00       6.0 











Physical Exam:


PHYSICAL EXAM


GENERAL:  Alert, oriented x 3, pleasant female, lying in bed comfortably, in no 


acute distress.


HEENT:  Normocephalic, atraumatic.  Anicteric.  No thrush.oral cankers upper and

lower lips


NECK:  Supple, no JVD, no lymphadenopathy.


LUNGS:  Clear bilaterally.


HEART:  S1, S2.


ABDOMEN:  Soft, nontender, nondistended.  Bowel sounds present.


EXTREMITIES:  Right lower extremity surgical wound dressing present with Prevena

wound.  Drain removed..  Not taken down.


Left lower extremity, no calf tenderness, no swelling.


DERMATOLOGIC:  Warm, dry, no generalized rash except for above.


NEUROLOGIC:  Alert, oriented x 3, grossly nonfocal.


PSYCHIATRIC:  Calm and cooperative.  PIV looks clean.


PICC line RUE clean





Medications:


Inpatient Meds:


Medications reviewed.





Labs:


Lab





Laboratory Tests








Test


 11/6/21


03:05


 


Hemoglobin


 9.5 g/dL


(12.0-15.5)


 


Hematocrit


 28.2 %


(36.0-47.0)


 


Mean Corpuscular Hemoglobin


Concent 34 g/dL


(31-37)











Objective:


Assessment:





Right TKA PJI 


ESR & [id number]


Swab cultures positive for MRSA


November 2, 2021


Status post I&D and polyethylene exchange with hardware retention


Intraoperative findings noted


"Purulence throughout the subcutaneous as well as intra-articular portion of the

knee consistent with acute postoperative periprosthetic joint infection.  

Cultures were taken intraoperatively"


Intraoperative cultures staph aureus





Bacteremia  MRSA present on admission; source right TKA PJI


Repeat blood cultures are negative from 11/3/2021





2.   Right knee arthroplasty on 10/12 for DJD


3.  Leukocytosis.


4.  History of asthma.


5.  Hypothyroidism.


6.  HISTORY OF ALLERGIES TO SULFA.


7.  Anemia.





Plan:


Plan of Care


Continue daptomycin 8 mg/kg daily, CK normal 11/3/2021


Follow-up repeat blood cultures negative 11/3 so far


acyclovir  local for lip lesions


Monitor labs and cultures


Wound/drain management as directed


PT and OT as directed


PICC line to be placed 


Maintain aspiration precautions


Pain control per orthopedics


 








Discussed with JOYCE MA MD            Nov 6, 2021 12:43

## 2021-11-07 VITALS — DIASTOLIC BLOOD PRESSURE: 56 MMHG | SYSTOLIC BLOOD PRESSURE: 121 MMHG

## 2021-11-07 VITALS — DIASTOLIC BLOOD PRESSURE: 71 MMHG | SYSTOLIC BLOOD PRESSURE: 128 MMHG

## 2021-11-07 VITALS — DIASTOLIC BLOOD PRESSURE: 48 MMHG | SYSTOLIC BLOOD PRESSURE: 153 MMHG

## 2021-11-07 VITALS — SYSTOLIC BLOOD PRESSURE: 125 MMHG | DIASTOLIC BLOOD PRESSURE: 62 MMHG

## 2021-11-07 VITALS — DIASTOLIC BLOOD PRESSURE: 68 MMHG | SYSTOLIC BLOOD PRESSURE: 132 MMHG

## 2021-11-07 RX ADMIN — ACETAMINOPHEN SCH MG: 500 TABLET ORAL at 21:09

## 2021-11-07 RX ADMIN — ACETAMINOPHEN SCH MG: 500 TABLET ORAL at 09:17

## 2021-11-07 RX ADMIN — DAPTOMYCIN SCH MLS/HR: 500 INJECTION, POWDER, LYOPHILIZED, FOR SOLUTION INTRAVENOUS at 10:24

## 2021-11-07 RX ADMIN — MULTIPLE VITAMINS W/ MINERALS TAB SCH TAB: TAB at 09:17

## 2021-11-07 RX ADMIN — TRAZODONE HYDROCHLORIDE SCH MG: 100 TABLET ORAL at 21:08

## 2021-11-07 RX ADMIN — FLUTICASONE PROPIONATE SCH SPRAY: 50 SPRAY, METERED NASAL at 09:00

## 2021-11-07 RX ADMIN — Medication SCH CAP: at 09:17

## 2021-11-07 RX ADMIN — BUDESONIDE SCH MG: 0.5 INHALANT RESPIRATORY (INHALATION) at 08:54

## 2021-11-07 RX ADMIN — HEPARIN SODIUM SCH UNIT: 5000 INJECTION, SOLUTION INTRAVENOUS; SUBCUTANEOUS at 21:12

## 2021-11-07 RX ADMIN — HEPARIN SODIUM SCH UNIT: 5000 INJECTION, SOLUTION INTRAVENOUS; SUBCUTANEOUS at 06:49

## 2021-11-07 RX ADMIN — MONTELUKAST SODIUM SCH MG: 10 TABLET, FILM COATED ORAL at 21:08

## 2021-11-07 RX ADMIN — ALBUTEROL SULFATE SCH MG: 108 AEROSOL, METERED RESPIRATORY (INHALATION) at 20:50

## 2021-11-07 RX ADMIN — BUDESONIDE SCH MG: 0.5 INHALANT RESPIRATORY (INHALATION) at 20:50

## 2021-11-07 RX ADMIN — Medication SCH MG: at 17:41

## 2021-11-07 RX ADMIN — ACETAMINOPHEN SCH MG: 500 TABLET ORAL at 14:51

## 2021-11-07 RX ADMIN — ALBUTEROL SULFATE SCH MG: 108 AEROSOL, METERED RESPIRATORY (INHALATION) at 08:54

## 2021-11-07 RX ADMIN — CETIRIZINE HYDROCHLORIDE SCH MG: 10 TABLET, FILM COATED ORAL at 09:17

## 2021-11-07 RX ADMIN — SENNOSIDES AND DOCUSATE SODIUM SCH TAB: 8.6; 5 TABLET ORAL at 09:19

## 2021-11-07 RX ADMIN — Medication SCH MG: at 09:16

## 2021-11-07 RX ADMIN — LEVOTHYROXINE SODIUM SCH MCG: 0.11 TABLET ORAL at 07:35

## 2021-11-07 RX ADMIN — Medication SCH CAP: at 21:08

## 2021-11-07 RX ADMIN — PANTOPRAZOLE SODIUM SCH MG: 40 TABLET, DELAYED RELEASE ORAL at 07:35

## 2021-11-07 RX ADMIN — HEPARIN SODIUM SCH UNIT: 5000 INJECTION, SOLUTION INTRAVENOUS; SUBCUTANEOUS at 14:00

## 2021-11-07 RX ADMIN — ACETAMINOPHEN SCH MG: 500 TABLET ORAL at 03:29

## 2021-11-07 NOTE — PDOC
[facility] [facility] PROGRESS NOTE


Date of Service


DOS:


DATE: 11/7/21 


TIME: 11:34





Chief Complaint


Chief Complaint


Wound dehiscence, history of allergic rhinitis asthma hypothyroidism, MRSA 

bacteremia





-Patient underwent right TKA on October 12 initially healed well.  Over the past

3 days noticed some wound dehiscence and increased drainage


-Presented emergency room today and there was concern for wound infection


-Blood cultures and wound cultures obtained emergency room.  Received 1 dose 

vancomycin in emergency room


 Infectious disease consulted.  Continue Dapto.  DC cefepime


-Diet as tolerated


-She reports that morphine and Dilaudid have really not been helping with pain. 

Try oral pain meds


-Orthopedic team performed I&D 11/2


-Home meds resumed as indicated.





History of Present Illness


History of Present Illness





Patient is 66-year-old female presented the emergency room today due to 3-day 

history of wound concerns.  Patient underwent a right TKA on October 12 and 

initially did well postop in terms of wound healing.  Said she was seen in 

clinic on the 22nd and wound was healing well then.  However over the past 3 

days she is noticed that the proximal parts of the surgical incision started 

"opening up."  Also noticed increased drainage from the wound as well.  She 

denies any sort of trauma to the site any sort of excessive movement.  Reports 

some generalized unwell feeling also.


I examined her patient was in a fair amount of pain.  She could not sit still 

she was uncomfortable.  She was really denying any sort of other symptoms includ

ing headache, vision change, dizziness, chest pain, shortness of breath, 

abdominal pain, dysuria.





11/2


Patient evaluated and examined at bedside.  Said her pain was pretty well 

controlled overnight.  Planning for surgery today.  Remains on vancomycin cefe

pime and Flagyl.  Continue for now.  Please obtain cultures from surgery.  PT OT

ordered.  Plan of care discussed with bedside RN.





11/3


Patient evaluated examined at bedside.  She was doing well says her pain is well

controlled.  Tolerated surgery well.  MRSA positive in blood and wound.  

Continue Dapto cefepime.  Plan of care discussed with bedside RN.





11/4


Patient evaluated examined at bedside.  Was resting in bed.  Says her pain had 

improved.  Tolerating antibiotics well.  Appears that she is gone need a long-

term course of daptomycin.  Continue for now.  Hold off PICC line until repeat 

cultures negative.  Plan of care discussed bedside RN





1/5


Patient evaluated and examined at bedside.  She was not doing very well today 

complaining of a fair bit of pain.  Unable to participate in PT OT.  Will 

evaluate pain medications.  Continue IV antibiotics.  Plan of care discussed 

with bedside RN.





11/6


Late entry for November 6


Patient evaluated examined at bedside.  Says pain much better than yesterday.  

Said she will try to participate in PT OT next time available.  Most recent 

cultures no growth today.  Suspecting will need long course daptomycin.  

Possible discharge this coming week with long-term antibiotics.





11/7


Patient evaluated examined at bedside.  Pain well controlled.  PT OT ordered.  

Continue antibiotics.  Most recent cultures no growth today.  Plan of care 

discussed with bedside RN.





Vitals/I&O


Vitals/I&O:





                                   Vital Signs








  Date Time  Temp Pulse Resp B/P (MAP) Pulse Ox O2 Delivery O2 Flow Rate FiO2


 


11/7/21 10:05      Room Air  


 


11/7/21 08:54     95   


 


11/7/21 07:00 97.6 71 16 128/71 (90)    





 97.6       














                                    I & O   


 


 11/6/21 11/6/21 11/7/21





 15:00 23:00 07:00


 


Intake Total 300 ml 220 ml 


 


Output Total   650 ml


 


Balance 300 ml 220 ml -650 ml











Physical Exam


Physical Exam:


GENERAL:  Alert, oriented x 3, pleasant female, lying in bed comfortably, in no 


acute distress.


HEENT:  Normocephalic, atraumatic.  Anicteric.  No thrush.oral cankers upper and

lower lips


NECK:  Supple, no JVD, no lymphadenopathy.


LUNGS:  Clear bilaterally.


HEART:  S1, S2.


ABDOMEN:  Soft, nontender, nondistended.  Bowel sounds present.


EXTREMITIES:  Right lower extremity surgical wound dressing present with Prevena

wound.  Drain removed..  Not taken down.


Left lower extremity, no calf tenderness, no swelling.


DERMATOLOGIC:  Warm, dry, no generalized rash except for above.


NEUROLOGIC:  Alert, oriented x 3, grossly nonfocal.


PSYCHIATRIC:  Calm and cooperative.  PIV looks clean.


PICC line RUE clean


General:  Alert, Oriented X3, Cooperative


Heart:  Regular rate, Normal S1, Normal S2


Lungs:  Clear


Abdomen:  Normal bowel sounds, Soft, No tenderness


Extremities:  No edema, Normal pulses


Skin:  Other (Proximal portion of right knee incision with serosanguineous 

drainage)





Assessment and Plan


Assessmemt and Plan


Problems


Medical Problems:


(1) Wound infection after surgery


Status: Acute  











Comment


Review of Relevant


I have reviewed the following items venancio (where applicable) has been applied.


Medications:





Current Medications








 Medications


  (Trade)  Dose


 Ordered  Sig/Tuan


 Route


 PRN Reason  Start Time


 Stop Time Status Last Admin


Dose Admin


 


 Docosanol


  (Abreva 10% Cold


 Sore Treatment)  1 silvia  5XDAY


 TP


   11/6/21 18:00


    11/7/21 10:00














Justifications for Admission


Other Justification














JOHNNY BENNETT MD          Nov 7, 2021 11:34

## 2021-11-07 NOTE — PDOC
[facility] [facility] PROGRESS NOTE


Date of Service


DOS:


DATE: 11/6/21 


TIME: 09:25





Chief Complaint


Chief Complaint


Wound dehiscence, history of allergic rhinitis asthma hypothyroidism, MRSA 

bacteremia





-Patient underwent right TKA on October 12 initially healed well.  Over the past

3 days noticed some wound dehiscence and increased drainage


-Presented emergency room today and there was concern for wound infection


-Blood cultures and wound cultures obtained emergency room.  Received 1 dose 

vancomycin in emergency room


 Infectious disease consulted.  Continue Dapto.  DC cefepime


-Diet as tolerated


-She reports that morphine and Dilaudid have really not been helping with pain. 

Try oral pain meds


-Orthopedic team performed I&D 11/2


-Home meds resumed as indicated.





History of Present Illness


History of Present Illness





Patient is 66-year-old female presented the emergency room today due to 3-day 

history of wound concerns.  Patient underwent a right TKA on October 12 and 

initially did well postop in terms of wound healing.  Said she was seen in 

clinic on the 22nd and wound was healing well then.  However over the past 3 

days she is noticed that the proximal parts of the surgical incision started 

"opening up."  Also noticed increased drainage from the wound as well.  She 

denies any sort of trauma to the site any sort of excessive movement.  Reports 

some generalized unwell feeling also.


I examined her patient was in a fair amount of pain.  She could not sit still 

she was uncomfortable.  She was really denying any sort of other symptoms includ

ing headache, vision change, dizziness, chest pain, shortness of breath, 

abdominal pain, dysuria.





11/2


Patient evaluated and examined at bedside.  Said her pain was pretty well 

controlled overnight.  Planning for surgery today.  Remains on vancomycin cefe

pime and Flagyl.  Continue for now.  Please obtain cultures from surgery.  PT OT

ordered.  Plan of care discussed with bedside RN.





11/3


Patient evaluated examined at bedside.  She was doing well says her pain is well

controlled.  Tolerated surgery well.  MRSA positive in blood and wound.  

Continue Dapto cefepime.  Plan of care discussed with bedside RN.





11/4


Patient evaluated examined at bedside.  Was resting in bed.  Says her pain had 

improved.  Tolerating antibiotics well.  Appears that she is gone need a long-

term course of daptomycin.  Continue for now.  Hold off PICC line until repeat 

cultures negative.  Plan of care discussed bedside RN





1/5


Patient evaluated and examined at bedside.  She was not doing very well today 

complaining of a fair bit of pain.  Unable to participate in PT OT.  Will 

evaluate pain medications.  Continue IV antibiotics.  Plan of care discussed 

with bedside RN.





11/6


Late entry for November 6


Patient evaluated examined at bedside.  Says pain much better than yesterday.  

Said she will try to participate in PT OT next time available.  Most recent 

cultures no growth today.  Suspecting will need long course daptomycin.  

Possible discharge this coming week with long-term antibiotics.





Vitals/I&O


Vitals/I&O:





                                   Vital Signs








  Date Time  Temp Pulse Resp B/P (MAP) Pulse Ox O2 Delivery O2 Flow Rate FiO2


 


11/7/21 09:23      Room Air  


 


11/7/21 08:54     95   


 


11/7/21 07:00 97.6 71 16 128/71 (90)    





 97.6       














                                    I & O   


 


 11/6/21 11/6/21 11/7/21





 15:00 23:00 07:00


 


Intake Total 300 ml 220 ml 


 


Output Total   650 ml


 


Balance 300 ml 220 ml -650 ml











Physical Exam


Physical Exam:


GENERAL:  Alert, oriented x 3, pleasant female, lying in bed comfortably, in no 


acute distress.


HEENT:  Normocephalic, atraumatic.  Anicteric.  No thrush.oral cankers upper and

lower lips


NECK:  Supple, no JVD, no lymphadenopathy.


LUNGS:  Clear bilaterally.


HEART:  S1, S2.


ABDOMEN:  Soft, nontender, nondistended.  Bowel sounds present.


EXTREMITIES:  Right lower extremity surgical wound dressing present with Prevena

wound.  Drain removed..  Not taken down.


Left lower extremity, no calf tenderness, no swelling.


DERMATOLOGIC:  Warm, dry, no generalized rash except for above.


NEUROLOGIC:  Alert, oriented x 3, grossly nonfocal.


PSYCHIATRIC:  Calm and cooperative.  PIV looks clean.


PICC line RUE clean


General:  Alert, Oriented X3, Cooperative


Heart:  Regular rate, Normal S1, Normal S2


Lungs:  Clear


Abdomen:  Normal bowel sounds, Soft, No tenderness


Extremities:  No edema, Normal pulses


Skin:  Other (Proximal portion of right knee incision with serosanguineous 

drainage)





Assessment and Plan


Assessmemt and Plan


Problems


Medical Problems:


(1) Wound infection after surgery


Status: Acute  











Comment


Review of Relevant


I have reviewed the following items venancio (where applicable) has been applied.


Medications:





Current Medications








 Medications


  (Trade)  Dose


 Ordered  Sig/Tuan


 Route


 PRN Reason  Start Time


 Stop Time Status Last Admin


Dose Admin


 


 Docosanol


  (Abreva 10% Cold


 Sore Treatment)  1 silvia  5XDAY


 TP


   11/6/21 18:00


    11/7/21 06:49














Justifications for Admission


Other Justification














JOHNNY BENNETT MD          Nov 7, 2021 09:27

## 2021-11-08 VITALS — SYSTOLIC BLOOD PRESSURE: 119 MMHG | DIASTOLIC BLOOD PRESSURE: 48 MMHG

## 2021-11-08 VITALS — DIASTOLIC BLOOD PRESSURE: 48 MMHG | SYSTOLIC BLOOD PRESSURE: 109 MMHG

## 2021-11-08 VITALS — DIASTOLIC BLOOD PRESSURE: 68 MMHG | SYSTOLIC BLOOD PRESSURE: 120 MMHG

## 2021-11-08 VITALS — DIASTOLIC BLOOD PRESSURE: 60 MMHG | SYSTOLIC BLOOD PRESSURE: 120 MMHG

## 2021-11-08 LAB
ALBUMIN SERPL-MCNC: 2.1 G/DL (ref 3.4–5)
ALBUMIN/GLOB SERPL: 0.5 {RATIO} (ref 1–1.7)
ALP SERPL-CCNC: 87 U/L (ref 46–116)
ALT SERPL-CCNC: 22 U/L (ref 14–59)
ANION GAP SERPL CALC-SCNC: 6 MMOL/L (ref 6–14)
AST SERPL-CCNC: 26 U/L (ref 15–37)
BASOPHILS # BLD AUTO: 0.1 X10^3/UL (ref 0–0.2)
BASOPHILS NFR BLD: 1 % (ref 0–3)
BILIRUB SERPL-MCNC: 0.3 MG/DL (ref 0.2–1)
BUN SERPL-MCNC: 6 MG/DL (ref 7–20)
BUN/CREAT SERPL: 9 (ref 6–20)
CALCIUM SERPL-MCNC: 7.9 MG/DL (ref 8.5–10.1)
CHLORIDE SERPL-SCNC: 109 MMOL/L (ref 98–107)
CK SERPL-CCNC: 78 U/L (ref 26–192)
CO2 SERPL-SCNC: 29 MMOL/L (ref 21–32)
CREAT SERPL-MCNC: 0.7 MG/DL (ref 0.6–1)
EOSINOPHIL NFR BLD: 0.2 X10^3/UL (ref 0–0.7)
EOSINOPHIL NFR BLD: 3 % (ref 0–3)
ERYTHROCYTE [DISTWIDTH] IN BLOOD BY AUTOMATED COUNT: 14.8 % (ref 11.5–14.5)
GFR SERPLBLD BASED ON 1.73 SQ M-ARVRAT: 83.7 ML/MIN
GLUCOSE SERPL-MCNC: 93 MG/DL (ref 70–99)
HCT VFR BLD CALC: 27.7 % (ref 36–47)
HGB BLD-MCNC: 9.5 G/DL (ref 12–15.5)
LYMPHOCYTES # BLD: 1.8 X10^3/UL (ref 1–4.8)
LYMPHOCYTES NFR BLD AUTO: 21 % (ref 24–48)
MCH RBC QN AUTO: 30 PG (ref 25–35)
MCHC RBC AUTO-ENTMCNC: 34 G/DL (ref 31–37)
MCV RBC AUTO: 89 FL (ref 79–100)
MONO #: 0.6 X10^3/UL (ref 0–1.1)
MONOCYTES NFR BLD: 7 % (ref 0–9)
NEUT #: 6 X10^3/UL (ref 1.8–7.7)
NEUTROPHILS NFR BLD AUTO: 69 % (ref 31–73)
PLATELET # BLD AUTO: 430 X10^3/UL (ref 140–400)
POTASSIUM SERPL-SCNC: 2.9 MMOL/L (ref 3.5–5.1)
PROT SERPL-MCNC: 6.2 G/DL (ref 6.4–8.2)
RBC # BLD AUTO: 3.13 X10^6/UL (ref 3.5–5.4)
SODIUM SERPL-SCNC: 144 MMOL/L (ref 136–145)
WBC # BLD AUTO: 8.7 X10^3/UL (ref 4–11)

## 2021-11-08 RX ADMIN — ACETAMINOPHEN SCH MG: 500 TABLET ORAL at 03:00

## 2021-11-08 RX ADMIN — Medication SCH CAP: at 08:04

## 2021-11-08 RX ADMIN — ALBUTEROL SULFATE SCH MG: 108 AEROSOL, METERED RESPIRATORY (INHALATION) at 07:46

## 2021-11-08 RX ADMIN — ONDANSETRON PRN MG: 2 INJECTION INTRAMUSCULAR; INTRAVENOUS at 12:22

## 2021-11-08 RX ADMIN — SENNOSIDES AND DOCUSATE SODIUM SCH TAB: 8.6; 5 TABLET ORAL at 08:04

## 2021-11-08 RX ADMIN — CETIRIZINE HYDROCHLORIDE SCH MG: 10 TABLET, FILM COATED ORAL at 08:05

## 2021-11-08 RX ADMIN — ACETAMINOPHEN SCH MG: 500 TABLET ORAL at 17:46

## 2021-11-08 RX ADMIN — MULTIPLE VITAMINS W/ MINERALS TAB SCH TAB: TAB at 08:04

## 2021-11-08 RX ADMIN — HEPARIN SODIUM SCH UNIT: 5000 INJECTION, SOLUTION INTRAVENOUS; SUBCUTANEOUS at 06:22

## 2021-11-08 RX ADMIN — FLUTICASONE PROPIONATE SCH SPRAY: 50 SPRAY, METERED NASAL at 08:11

## 2021-11-08 RX ADMIN — DAPTOMYCIN SCH MLS/HR: 500 INJECTION, POWDER, LYOPHILIZED, FOR SOLUTION INTRAVENOUS at 12:16

## 2021-11-08 RX ADMIN — Medication SCH MG: at 17:00

## 2021-11-08 RX ADMIN — PANTOPRAZOLE SODIUM SCH MG: 40 TABLET, DELAYED RELEASE ORAL at 06:20

## 2021-11-08 RX ADMIN — Medication SCH MG: at 08:04

## 2021-11-08 RX ADMIN — ACETAMINOPHEN SCH MG: 500 TABLET ORAL at 08:05

## 2021-11-08 RX ADMIN — ACETAMINOPHEN SCH MG: 500 TABLET ORAL at 15:16

## 2021-11-08 RX ADMIN — LEVOTHYROXINE SODIUM SCH MCG: 0.11 TABLET ORAL at 06:20

## 2021-11-08 RX ADMIN — BUDESONIDE SCH MG: 0.5 INHALANT RESPIRATORY (INHALATION) at 07:46

## 2021-11-08 RX ADMIN — HEPARIN SODIUM SCH UNIT: 5000 INJECTION, SOLUTION INTRAVENOUS; SUBCUTANEOUS at 14:00

## 2021-11-08 NOTE — PDOC
[facility] [facility] PROGRESS NOTE


Date of Service


DOS:


DATE: 11/8/21 


TIME: 07:51





Chief Complaint


Chief Complaint


Right knee Wound dehiscence


history of allergic rhinitis


asthma


hypothyroidism


 MRSA bacteremia





Plan:


Continue daptomycin 8 mg/kg daily, CK normal 11/3/2021


Follow-up repeat blood cultures negative 11/3 so far


acyclovir  local for lip lesions


PICC  and complications discussed


Probiotics


Social work to assist with discharge antibiotics


Q. Monday labs CBC/BUN/creatinine/CPK/ESR/CRP.  Fax results to [phone]


Follow-up ID clinic November 23rd at 2:30 PM ,[phone] for appointment


Wound/VAC care and activity per orthopedics





History of Present Illness


History of Present Illness


Ms Rogers is 66-year-old female presented the emergency room today due to 3-day 

history of wound concerns.  Patient underwent a right TKA on October 12 and 

initially did well postop in terms of wound healing.  Said she was seen in 

clinic on the 22nd and wound was healing well then.  However over the past 3 

days she is noticed that the proximal parts of the surgical incision started 

"opening up."  Also noticed increased drainage from the wound as well.  She 

denies any sort of trauma to the site any sort of excessive movement.  Reports 

some generalized unwell feeling also.


I examined her patient was in a fair amount of pain.  She could not sit still 

she was uncomfortable.  She was really denying any sort of other symptoms 

including headache, vision change, dizziness, chest pain, shortness of breath, 

abdominal pain, dysuria.





11/2: Patient evaluated and examined at bedside.  Said her pain was pretty well 

controlled overnight.  Planning for surgery today.  Remains on vancomycin 

cefepime and Flagyl.  Continue for now.  Please obtain cultures from surgery.  

PT OT ordered.  Plan of care discussed with bedside RN.





11/3: Patient evaluated examined at bedside.  She was doing well says her pain 

is well controlled.  Tolerated surgery well.  MRSA positive in blood and wound. 

Continue Dapto cefepime.  Plan of care discussed with bedside RN.





11/4: Patient evaluated examined at bedside.  Was resting in bed.  Says her pain

had improved.  Tolerating antibiotics well.  Appears that she is gone need a 

long-term course of daptomycin.  Continue for now.  Hold off PICC line until 

repeat cultures negative.  Plan of care discussed bedside RN





11/5: Patient evaluated and examined at bedside.  She was not doing very well 

today complaining of a fair bit of pain.  Unable to participate in PT OT.  Will 

evaluate pain medications.  Continue IV antibiotics.  Plan of care discussed 

with bedside RN.





11/6


Late entry for November 6


Patient evaluated examined at bedside.  Says pain much better than yesterday.  

Said she will try to participate in PT OT next time available.  Most recent 

cultures no growth today.  Suspecting will need long course daptomycin.  

Possible discharge this coming week with long-term antibiotics.





11/7: Patient evaluated examined at bedside.  Pain well controlled.  PT OT 

ordered.  Continue antibiotics.  Most recent cultures no growth today.  Plan of 

care discussed with bedside RN.





Feeling well afebrile.  Cultures no growth to date.  Plan for daptomycin 

infusions at home versus outpatient work with social work today.  No chest pain 

or shortness of breath





Vitals/I&O


Vitals/I&O:





                                   Vital Signs








  Date Time  Temp Pulse Resp B/P (MAP) Pulse Ox O2 Delivery O2 Flow Rate FiO2


 


11/8/21 07:47     98 Room Air  


 


11/8/21 03:00 97.7 72 17 120/68 (85)    





 97.7       














                                    I & O   


 


 11/7/21 11/7/21 11/8/21





 15:00 23:00 07:00


 


Output Total 300 ml 200 ml 


 


Balance -300 ml -200 ml 











Physical Exam


Physical Exam:


GENERAL:  Alert, oriented x 3, pleasant female, lying in bed comfortably, in no 


acute distress.


HEENT:  Normocephalic, atraumatic.  Anicteric.  No thrush.oral cankers upper and

lower lips


NECK:  Supple, no JVD, no lymphadenopathy.


LUNGS:  Clear bilaterally.


HEART:  S1, S2.


ABDOMEN:  Soft, nontender, nondistended.  Bowel sounds present.


EXTREMITIES:  Right lower extremity surgical wound dressing present with Prevena

wound.  Drain removed..  Not taken down.


Left lower extremity, no calf tenderness, no swelling.


DERMATOLOGIC:  Warm, dry, no generalized rash except for above.


NEUROLOGIC:  Alert, oriented x 3, grossly nonfocal.


PSYCHIATRIC:  Calm and cooperative.  PIV looks clean.


PICC line RUE clean


General:  Alert, Oriented X3, Cooperative


Heart:  Regular rate, Normal S1, Normal S2


Lungs:  Clear


Abdomen:  Normal bowel sounds, Soft, No tenderness


Extremities:  No edema, Normal pulses


Skin:  Other (Proximal portion of right knee incision with serosanguineous 

drainage)





Labs


Labs:





Laboratory Tests








Test


 11/8/21


06:00


 


White Blood Count


 8.7 x10^3/uL


(4.0-11.0)


 


Red Blood Count


 3.13 x10^6/uL


(3.50-5.40)


 


Hemoglobin


 9.5 g/dL


(12.0-15.5)


 


Hematocrit


 27.7 %


(36.0-47.0)


 


Mean Corpuscular Volume 89 fL () 


 


Mean Corpuscular Hemoglobin 30 pg (25-35) 


 


Mean Corpuscular Hemoglobin


Concent 34 g/dL


(31-37)


 


Red Cell Distribution Width


 14.8 %


(11.5-14.5)


 


Platelet Count


 430 x10^3/uL


(140-400)


 


Neutrophils (%) (Auto) 69 % (31-73) 


 


Lymphocytes (%) (Auto) 21 % (24-48) 


 


Monocytes (%) (Auto) 7 % (0-9) 


 


Eosinophils (%) (Auto) 3 % (0-3) 


 


Basophils (%) (Auto) 1 % (0-3) 


 


Neutrophils # (Auto)


 6.0 x10^3/uL


(1.8-7.7)


 


Lymphocytes # (Auto)


 1.8 x10^3/uL


(1.0-4.8)


 


Monocytes # (Auto)


 0.6 x10^3/uL


(0.0-1.1)


 


Eosinophils # (Auto)


 0.2 x10^3/uL


(0.0-0.7)


 


Basophils # (Auto)


 0.1 x10^3/uL


(0.0-0.2)


 


Sodium Level


 144 mmol/L


(136-145)


 


Potassium Level


 2.9 mmol/L


(3.5-5.1)


 


Chloride Level


 109 mmol/L


()


 


Carbon Dioxide Level


 29 mmol/L


(21-32)


 


Anion Gap 6 (6-14) 


 


Blood Urea Nitrogen 6 mg/dL (7-20) 


 


Creatinine


 0.7 mg/dL


(0.6-1.0)


 


Estimated GFR


(Cockcroft-Gault) 83.7 





 


BUN/Creatinine Ratio 9 (6-20) 


 


Glucose Level


 93 mg/dL


(70-99)


 


Calcium Level


 7.9 mg/dL


(8.5-10.1)


 


Total Bilirubin


 0.3 mg/dL


(0.2-1.0)


 


Aspartate Amino Transf


(AST/SGOT) 26 U/L (15-37) 





 


Alanine Aminotransferase


(ALT/SGPT) 22 U/L (14-59) 





 


Alkaline Phosphatase


 87 U/L


()


 


Creatine Kinase


 78 U/L


()


 


Total Protein


 6.2 g/dL


(6.4-8.2)


 


Albumin


 2.1 g/dL


(3.4-5.0)


 


Albumin/Globulin Ratio 0.5 (1.0-1.7) 











Assessment and Plan


Assessmemt and Plan


Problems


Medical Problems:


(1) Wound infection after surgery


Status: Acute  











Comment


Review of Relevant


I have reviewed the following items venancio (where applicable) has been applied.





Justifications for Admission


Other Justification














JOHNNY DELGADO MD         Nov 8, 2021 07:52

## 2021-11-08 NOTE — PDOC3
Discharge Summary


Visit Information


Date of Admission:  Nov 1, 2021


Date of Discharge:  Nov 8, 2021


Admitting Diagnosis:  Right knee wound


Final Diagnosis


MRSA bacteremia 





Problems


Medical Problems:


(1) Wound infection after surgery


Status: Acute  








Brief Hospital Course


Allergies





                                    Allergies








Coded Allergies Type Severity Reaction Last Updated Verified


 


  Sulfa (Sulfonamide Antibiotics) Allergy Intermediate Rash 11/2/21 Yes


 


  I S O L A T I O N *CONTACT* Allergy Unknown  11/4/21 Yes








Vital Signs





Vital Signs








  Date Time  Temp Pulse Resp B/P (MAP) Pulse Ox O2 Delivery O2 Flow Rate FiO2


 


11/8/21 15:00 98.3 74 16 109/48 (68) 97 Room Air  





 98.3       








Lab Results





Laboratory Tests








Test


 11/8/21


06:00


 


White Blood Count


 8.7 x10^3/uL


(4.0-11.0)


 


Red Blood Count


 3.13 x10^6/uL


(3.50-5.40)


 


Hemoglobin


 9.5 g/dL


(12.0-15.5)


 


Hematocrit


 27.7 %


(36.0-47.0)


 


Mean Corpuscular Volume 89 fL () 


 


Mean Corpuscular Hemoglobin 30 pg (25-35) 


 


Mean Corpuscular Hemoglobin


Concent 34 g/dL


(31-37)


 


Red Cell Distribution Width


 14.8 %


(11.5-14.5)


 


Platelet Count


 430 x10^3/uL


(140-400)


 


Neutrophils (%) (Auto) 69 % (31-73) 


 


Lymphocytes (%) (Auto) 21 % (24-48) 


 


Monocytes (%) (Auto) 7 % (0-9) 


 


Eosinophils (%) (Auto) 3 % (0-3) 


 


Basophils (%) (Auto) 1 % (0-3) 


 


Neutrophils # (Auto)


 6.0 x10^3/uL


(1.8-7.7)


 


Lymphocytes # (Auto)


 1.8 x10^3/uL


(1.0-4.8)


 


Monocytes # (Auto)


 0.6 x10^3/uL


(0.0-1.1)


 


Eosinophils # (Auto)


 0.2 x10^3/uL


(0.0-0.7)


 


Basophils # (Auto)


 0.1 x10^3/uL


(0.0-0.2)


 


Erythrocyte Sedimentation Rate 75 (0-25) 


 


Sodium Level


 144 mmol/L


(136-145)


 


Potassium Level


 2.9 mmol/L


(3.5-5.1)


 


Chloride Level


 109 mmol/L


()


 


Carbon Dioxide Level


 29 mmol/L


(21-32)


 


Anion Gap 6 (6-14) 


 


Blood Urea Nitrogen 6 mg/dL (7-20) 


 


Creatinine


 0.7 mg/dL


(0.6-1.0)


 


Estimated GFR


(Cockcroft-Gault) 83.7 





 


BUN/Creatinine Ratio 9 (6-20) 


 


Glucose Level


 93 mg/dL


(70-99)


 


Calcium Level


 7.9 mg/dL


(8.5-10.1)


 


Total Bilirubin


 0.3 mg/dL


(0.2-1.0)


 


Aspartate Amino Transf


(AST/SGOT) 26 U/L (15-37) 





 


Alanine Aminotransferase


(ALT/SGPT) 22 U/L (14-59) 





 


Alkaline Phosphatase


 87 U/L


()


 


Creatine Kinase


 78 U/L


()


 


Total Protein


 6.2 g/dL


(6.4-8.2)


 


Albumin


 2.1 g/dL


(3.4-5.0)


 


Albumin/Globulin Ratio 0.5 (1.0-1.7) 








Laboratory Tests








Test


 11/8/21


06:00


 


White Blood Count


 8.7 x10^3/uL


(4.0-11.0)


 


Red Blood Count


 3.13 x10^6/uL


(3.50-5.40)


 


Hemoglobin


 9.5 g/dL


(12.0-15.5)


 


Hematocrit


 27.7 %


(36.0-47.0)


 


Mean Corpuscular Volume 89 fL () 


 


Mean Corpuscular Hemoglobin 30 pg (25-35) 


 


Mean Corpuscular Hemoglobin


Concent 34 g/dL


(31-37)


 


Red Cell Distribution Width


 14.8 %


(11.5-14.5)


 


Platelet Count


 430 x10^3/uL


(140-400)


 


Neutrophils (%) (Auto) 69 % (31-73) 


 


Lymphocytes (%) (Auto) 21 % (24-48) 


 


Monocytes (%) (Auto) 7 % (0-9) 


 


Eosinophils (%) (Auto) 3 % (0-3) 


 


Basophils (%) (Auto) 1 % (0-3) 


 


Neutrophils # (Auto)


 6.0 x10^3/uL


(1.8-7.7)


 


Lymphocytes # (Auto)


 1.8 x10^3/uL


(1.0-4.8)


 


Monocytes # (Auto)


 0.6 x10^3/uL


(0.0-1.1)


 


Eosinophils # (Auto)


 0.2 x10^3/uL


(0.0-0.7)


 


Basophils # (Auto)


 0.1 x10^3/uL


(0.0-0.2)


 


Erythrocyte Sedimentation Rate 75 (0-25) 


 


Sodium Level


 144 mmol/L


(136-145)


 


Potassium Level


 2.9 mmol/L


(3.5-5.1)


 


Chloride Level


 109 mmol/L


()


 


Carbon Dioxide Level


 29 mmol/L


(21-32)


 


Anion Gap 6 (6-14) 


 


Blood Urea Nitrogen 6 mg/dL (7-20) 


 


Creatinine


 0.7 mg/dL


(0.6-1.0)


 


Estimated GFR


(Cockcroft-Gault) 83.7 





 


BUN/Creatinine Ratio 9 (6-20) 


 


Glucose Level


 93 mg/dL


(70-99)


 


Calcium Level


 7.9 mg/dL


(8.5-10.1)


 


Total Bilirubin


 0.3 mg/dL


(0.2-1.0)


 


Aspartate Amino Transf


(AST/SGOT) 26 U/L (15-37) 





 


Alanine Aminotransferase


(ALT/SGPT) 22 U/L (14-59) 





 


Alkaline Phosphatase


 87 U/L


()


 


Creatine Kinase


 78 U/L


()


 


Total Protein


 6.2 g/dL


(6.4-8.2)


 


Albumin


 2.1 g/dL


(3.4-5.0)


 


Albumin/Globulin Ratio 0.5 (1.0-1.7) 








Brief Hospital Course


Ms Rogers is 66-year-old female presented the emergency room today due to 3-day 

history of wound concerns.  Patient underwent a right TKA on October 12 and 

initially did well postop in terms of wound healing.  Said she was seen in 

clinic on the 22nd and wound was healing well then.  However over the past 3 

days she is noticed that the proximal parts of the surgical incision started 

"opening up."  Also noticed increased drainage from the wound as well.  She 

denies any sort of trauma to the site any sort of excessive movement.  Reports 

some generalized unwell feeling also.


I examined her patient was in a fair amount of pain.  She could not sit still 

she was uncomfortable.  She was really denying any sort of other symptoms 

including headache, vision change, dizziness, chest pain, shortness of breath, 

abdominal pain, dysuria.





11/2: Patient evaluated and examined at bedside.  Said her pain was pretty well 

controlled overnight.  Planning for surgery today.  Remains on vancomycin 

cefepime and Flagyl.  Continue for now.  Please obtain cultures from surgery.  

PT OT ordered. To OR for washout.





11/3: Patient evaluated examined at bedside.  She was doing well says her pain 

is well controlled.  Tolerated surgery well.  MRSA positive in blood and wound. 

Continue Dapto cefepime.  Plan of care discussed with bedside RN.





11/4: Patient evaluated examined at bedside.  Was resting in bed.  Says her pain

had improved.  Tolerating antibiotics well.  Appears that she is gone need a 

long-term course of daptomycin.  Continue for now.  Hold off PICC line until 

repeat cultures negative.  Plan of care discussed bedside RN





11/5: Patient evaluated and examined at bedside.  She was not doing very well 

today complaining of a fair bit of pain.  Unable to participate in PT OT.  Will 

evaluate pain medications.  Continue IV antibiotics.  Plan of care discussed 

with bedside RN.





11/6


Late entry for November 6


Patient evaluated examined at bedside.  Says pain much better than yesterday.  

Said she will try to participate in PT OT next time available.  Most recent 

cultures no growth today.  Suspecting will need long course daptomycin.  

Possible discharge this coming week with long-term antibiotics.





11/7: Patient evaluated examined at bedside.  Pain well controlled.  PT OT 

ordered.  Continue antibiotics.  Most recent cultures no growth today.  Plan of 

care discussed with bedside RN.





Feeling well afebrile. Repeat blood cultures no growth to date.  Plan for 

daptomycin infusions outpatient work with social work today.  No chest pain or 

shortness of breath. outpatient PT.





Consults: Ortho, ID





Problem list:


Right knee Wound dehiscence


history of allergic rhinitis


asthma


hypothyroidism


 MRSA bacteremia





Plan:


Continue daptomycin 8 mg/kg daily, CK normal 11/3/2021


Follow-up repeat blood cultures negative 11/3 so far


acyclovir  local for lip lesions


PICC  and complications discussed


Probiotics


Social work to assist with discharge antibiotics


Q. Monday labs CBC/BUN/creatinine/CPK/ESR/CRP.  Fax results to [phone]


Follow-up ID clinic November 23rd at 2:30 PM ,[phone] for appointment


Wound/VAC care and activity per orthopedics





Greater than 30 minutes spent on d/c home with outpatient infusion f/u





Discharge Information


Condition at Discharge:  Improved


Follow Up:  Weeks


Disposition/Orders:  D/C to Home


Scheduled


Cetirizine Hcl (Zyrtec) 10 Mg Tablet, 10 MG PO DAILY for allergy control, 

(Reported)


   Entered as Reported by: AMINATA WADSWORTH on 9/27/21 1557


   Last Action: Continued on 11/1/21 1203 by JOHNNY BENNETT MD


Daptomycin (Daptomycin) 350 Mg Vial, 420 MG IV DAILY for MRSA bacteremia for 21 

Days, #26


   Prescribed by: JOHNNY DELGADO MD on 11/8/21 1615


Docosanol (Abreva) 2 Gm Cream..g., 1 SUSANNE TP 5XDAY for Cold sore\ for 5 Days, #25


   Prescribed by: JOHNNY DELGADO MD on 11/8/21 1615


Fluticasone Propionate (Fluticasone Propionate Nasal Spray) 16 Gm [place].susp, 2 

SPRAY NS DAILY for control allergies, (Reported)


   Entered as Reported by: AMINATA WADSWORTH on 9/27/21 1557


   Last Action: Continued on 11/1/21 1203 by JOHNNY BENNETT MD


Fluticasone/Salmeterol (Advair 500-50 Diskus) 1 Each Disk.w.dev, 2 INH IH BID 

for asthma control, (Reported)


   Entered as Reported by: AMINATA WADSWORTH on 9/27/21 1557


   Last Action: Converted on 11/1/21 1203 by JOHNNY BENNETT MD


Icosapent Ethyl (Vascepa) 1 Gm Capsule, 1 GM PO BID for control hyperlipidemia, 

(Reported)


   Entered as Reported by: AMINATA WADSWORTH on 9/27/21 1557


   Last Action: HELD on 11/1/21 1203 by JOHNNY BENNETT MD


Levothyroxine Sodium (Levothyroxine Sodium) 112 Mcg Tablet, 112 MCG PO DAILYAC 

for THYROID SUPPLEMENT, #30 Ref 0 (Reported)


   Entered as Reported by: AMINATA WADSWORTH on 9/27/21 1557


   Last Action: Continued on 11/1/21 1203 by JOHNNY BENNETT MD


Montelukast Sodium (Montelukast Sodium Tablet  **) 10 Mg Tablet, 10 MG PO HS for

FOR ASTHMA, Ref 0 (Reported)


   Entered as Reported by: AMINATA WADSWORTH on 9/27/21 1557


   Last Action: Continued on 11/1/21 1203 by JOHNNY BENNETT MD


Pantoprazole Sodium (Pantoprazole Sodium  **) 40 Mg Tablet.dr, 40 MG PO DAILYAC 

for GERD, (Reported)


   Entered as Reported by: Cinthia Silva on 10/12/21 0645


   Last Action: Continued on 11/1/21 1203 by JOHNNY BENNETT MD


Rivaroxaban (Xarelto) 10 Mg Tablet, 1 TAB PO DAILY for DVT PPX for 12 Days, #12 

Ref 0


   Prescribed by: JOHNNY DELGADO MD on 10/16/21 1546


   Last Action: HELD on 11/1/21 1203 by JOHNNY BENNETT MD


Trazodone Hcl (Trazodone Hcl) 100 Mg Tablet, 100 MG PO HS for sleep aid, 

(Reported)


   Entered as Reported by: AMINATA WADSWORTH on 9/27/21 1557


   Last Action: Continued on 11/1/21 1203 by JOHNNY BENNETT MD





Scheduled PRN


Albuterol Sulfate (Proair Hfa) 8.5 Gm Hfa.aer.ad, 2 PUFF INH PRN Q6HRS PRN for 

SHORTNESS OF BREATH, (Reported)


   Entered as Reported by: AMINATA WADSWORTH on 9/27/21 1557


Diclofenac Sodium (Voltaren Arthritis Pain) 20 Gm Gel..gram., 20 GM TP PRN DAILY

PRN for PAIN, (Reported)


   Entered as Reported by: AMINATA WADSWORTH on 9/27/21 1557


   Last Action: Continued on 11/1/21 1203 by JOHNNY BENNETT MD


Famotidine (Pepcid) 20 Mg Tablet, 20 MG PO PRN DAILY PRN for control gerd, 

(Reported)


   Entered as Reported by: AMINATA WADSWORTH on 9/27/21 1557


   Last Action: Continued on 11/1/21 1203 by JOHNNY BENNETT MD


Oxycodone Hcl (Oxycodone Hcl Immed.release **) 5 Mg Tablet, 5 MG PO PRN Q8HRS 

PRN for PAIN for 6 Days, #18


   Prescribed by: JOHNNY DELGADO MD on 10/16/21 1547


   Last Action: HELD on 11/1/21 1203 by JOHNNY BENNETT MD





Justicifation of Admission Dx:


Justifications for Admission:


Justification of Admission Dx:  Yes











JOHNNY DELGADO MD         Nov 8, 2021 16:19

## 2021-11-08 NOTE — NUR
SW following. Discussed with RN, pt from home with spouse, room air, regular diet. SW 
received script for IV abx at discharge. Pt wanting to do home infusion if possible. [facility] 
is a preferred provider of pt's insurance and pt is wanting to get out of the hospital 
today. SALO phoned and faxed referral to [facility]. Requested info on whether [facility] could 
provide the nursing care for PICC as pt does not have any other home health needs at this 
time (verified with RN). Awaiting benefits. SALO will continue to follow.

-------------------------------------------------------------------------------

Addendum: 11/08/21 at 1556 by ILENE LEONG

-------------------------------------------------------------------------------

Benefits finally back from [facility] - $650 total for abx and supplies per week. SALO met with pt, 
pt would like to do outpatient as she cannot afford this. SALO faxed referral to outpatient. 
Emigdio [employer]) getting insurance auth from [employer]. No auth needed for outpatient infusion, 
outpatient notified. Plan for 0900 tomorrow for infusion time, RN notified.

## 2021-11-08 NOTE — PDOC
Infectious Disease Note


Subjective:


Subjective


Patient is  feeling well today.


postop site pain is controlled


no f/n/v/d


Eager for discharge home





Vital Signs:


Vital Signs





Vital Signs








  Date Time  Temp Pulse Resp B/P (MAP) Pulse Ox O2 Delivery O2 Flow Rate FiO2


 


11/8/21 07:47     98 Room Air  


 


11/8/21 07:00 98.2 70 16 120/60 (80)    





 98.2       











Physical Exam:


PHYSICAL EXAM


GENERAL:  Alert, oriented x 3, pleasant female, lying in bed comfortably, in no 


acute distress.


HEENT:  Normocephalic, atraumatic.  Anicteric.  No thrush.oral cankers upper and

lower lips


NECK:  Supple, no JVD, no lymphadenopathy.


LUNGS:  Clear bilaterally.


HEART:  S1, S2.


ABDOMEN:  Soft, nontender, nondistended.  Bowel sounds present.


EXTREMITIES:  Right lower extremity surgical wound dressing present with Prevena

wound.  Drain removed..  Not taken down.


Left lower extremity, no calf tenderness, no swelling.


DERMATOLOGIC:  Warm, dry, no generalized rash except for above.


NEUROLOGIC:  Alert, oriented x 3, grossly nonfocal.


PSYCHIATRIC:  Calm and cooperative.  PIV looks clean.


PICC line RUE clean





Medications:


Inpatient Meds:


Medications reviewed.





Labs:


Lab





Laboratory Tests








Test


 11/8/21


06:00


 


White Blood Count


 8.7 x10^3/uL


(4.0-11.0)


 


Red Blood Count


 3.13 x10^6/uL


(3.50-5.40)


 


Hemoglobin


 9.5 g/dL


(12.0-15.5)


 


Hematocrit


 27.7 %


(36.0-47.0)


 


Mean Corpuscular Volume 89 fL () 


 


Mean Corpuscular Hemoglobin 30 pg (25-35) 


 


Mean Corpuscular Hemoglobin


Concent 34 g/dL


(31-37)


 


Red Cell Distribution Width


 14.8 %


(11.5-14.5)


 


Platelet Count


 430 x10^3/uL


(140-400)


 


Neutrophils (%) (Auto) 69 % (31-73) 


 


Lymphocytes (%) (Auto) 21 % (24-48) 


 


Monocytes (%) (Auto) 7 % (0-9) 


 


Eosinophils (%) (Auto) 3 % (0-3) 


 


Basophils (%) (Auto) 1 % (0-3) 


 


Neutrophils # (Auto)


 6.0 x10^3/uL


(1.8-7.7)


 


Lymphocytes # (Auto)


 1.8 x10^3/uL


(1.0-4.8)


 


Monocytes # (Auto)


 0.6 x10^3/uL


(0.0-1.1)


 


Eosinophils # (Auto)


 0.2 x10^3/uL


(0.0-0.7)


 


Basophils # (Auto)


 0.1 x10^3/uL


(0.0-0.2)


 


Sodium Level


 144 mmol/L


(136-145)


 


Potassium Level


 2.9 mmol/L


(3.5-5.1)


 


Chloride Level


 109 mmol/L


()


 


Carbon Dioxide Level


 29 mmol/L


(21-32)


 


Anion Gap 6 (6-14) 


 


Blood Urea Nitrogen 6 mg/dL (7-20) 


 


Creatinine


 0.7 mg/dL


(0.6-1.0)


 


Estimated GFR


(Cockcroft-Gault) 83.7 





 


BUN/Creatinine Ratio 9 (6-20) 


 


Glucose Level


 93 mg/dL


(70-99)


 


Calcium Level


 7.9 mg/dL


(8.5-10.1)


 


Total Bilirubin


 0.3 mg/dL


(0.2-1.0)


 


Aspartate Amino Transf


(AST/SGOT) 26 U/L (15-37) 





 


Alanine Aminotransferase


(ALT/SGPT) 22 U/L (14-59) 





 


Alkaline Phosphatase


 87 U/L


()


 


Creatine Kinase


 78 U/L


()


 


Total Protein


 6.2 g/dL


(6.4-8.2)


 


Albumin


 2.1 g/dL


(3.4-5.0)


 


Albumin/Globulin Ratio 0.5 (1.0-1.7) 











Objective:


Assessment:





Right TKA PJI 


ESR & [id number]


Swab cultures positive for MRSA


November 2, 2021


Status post I&D and polyethylene exchange with hardware retention


Intraoperative findings noted


"Purulence throughout the subcutaneous as well as intra-articular portion of the

knee consistent with acute postoperative periprosthetic joint infection.  

Cultures were taken intraoperatively"


Intraoperative cultures staph aureus





Bacteremia  MRSA present on admission; source right TKA PJI


Repeat blood cultures are negative from 11/3/2021





2.   Right knee arthroplasty on 10/12 for DJD


3.  Leukocytosis.


4.  History of asthma.


5.  Hypothyroidism.


6.  HISTORY OF ALLERGIES TO SULFA.


7.  Anemia.





Plan:


Plan of Care


Continue daptomycin 8 mg/kg daily, CK normal 11/3/2021


Follow-up repeat blood cultures negative 11/3 so far


acyclovir  local for lip lesions


PICC line placement


PICC  and complications discussed


Side effects of antibiotics discussed


Probiotics


Prescription in chart


Social work to assist with discharge antibiotics


Q. Monday labs CBC/BUN/creatinine/CPK/ESR/CRP.  Fax results to [phone]


Follow-up ID clinic November 23rd at 2:30 PM ,[phone] for appointment


Wound/VAC care and activity per orthopedics








 








Discussed with JOYCE MA MD            Nov 8, 2021 09:27